# Patient Record
Sex: MALE | Employment: OTHER | URBAN - METROPOLITAN AREA
[De-identification: names, ages, dates, MRNs, and addresses within clinical notes are randomized per-mention and may not be internally consistent; named-entity substitution may affect disease eponyms.]

---

## 2017-01-17 ENCOUNTER — APPOINTMENT (OUTPATIENT)
Dept: CT IMAGING | Age: 82
DRG: 064 | End: 2017-01-17
Attending: EMERGENCY MEDICINE
Payer: MEDICARE

## 2017-01-17 ENCOUNTER — HOSPITAL ENCOUNTER (INPATIENT)
Age: 82
LOS: 3 days | Discharge: NURSING, REHAB OR LONG-TERM CARE | DRG: 064 | End: 2017-01-20
Attending: EMERGENCY MEDICINE | Admitting: HOSPITALIST
Payer: MEDICARE

## 2017-01-17 DIAGNOSIS — I63.9 CEREBROVASCULAR ACCIDENT (CVA), UNSPECIFIED MECHANISM (HCC): Primary | ICD-10-CM

## 2017-01-17 PROBLEM — G45.9 TIA (TRANSIENT ISCHEMIC ATTACK): Status: ACTIVE | Noted: 2017-01-17

## 2017-01-17 LAB
ALBUMIN SERPL BCP-MCNC: 3.8 G/DL (ref 3.5–5)
ALBUMIN/GLOB SERPL: 1 {RATIO} (ref 1.1–2.2)
ALP SERPL-CCNC: 80 U/L (ref 45–117)
ALT SERPL-CCNC: 22 U/L (ref 12–78)
ANION GAP BLD CALC-SCNC: 11 MMOL/L (ref 5–15)
APTT PPP: 27.5 SEC (ref 22.1–32.5)
AST SERPL W P-5'-P-CCNC: 16 U/L (ref 15–37)
BASOPHILS # BLD AUTO: 0 K/UL (ref 0–0.1)
BASOPHILS # BLD: 0 % (ref 0–1)
BILIRUB SERPL-MCNC: 0.7 MG/DL (ref 0.2–1)
BUN SERPL-MCNC: 48 MG/DL (ref 6–20)
BUN/CREAT SERPL: 24 (ref 12–20)
CALCIUM SERPL-MCNC: 8.7 MG/DL (ref 8.5–10.1)
CHLORIDE SERPL-SCNC: 107 MMOL/L (ref 97–108)
CK MB CFR SERPL CALC: 2 % (ref 0–2.5)
CK MB SERPL-MCNC: 6.2 NG/ML (ref 5–25)
CK SERPL-CCNC: 304 U/L (ref 39–308)
CO2 SERPL-SCNC: 24 MMOL/L (ref 21–32)
CREAT SERPL-MCNC: 2.03 MG/DL (ref 0.7–1.3)
EOSINOPHIL # BLD: 0.1 K/UL (ref 0–0.4)
EOSINOPHIL NFR BLD: 2 % (ref 0–7)
ERYTHROCYTE [DISTWIDTH] IN BLOOD BY AUTOMATED COUNT: 14.2 % (ref 11.5–14.5)
GLOBULIN SER CALC-MCNC: 3.7 G/DL (ref 2–4)
GLUCOSE SERPL-MCNC: 105 MG/DL (ref 65–100)
HCT VFR BLD AUTO: 33.9 % (ref 36.6–50.3)
HGB BLD-MCNC: 11.1 G/DL (ref 12.1–17)
INR PPP: 1 (ref 0.9–1.1)
LYMPHOCYTES # BLD AUTO: 17 % (ref 12–49)
LYMPHOCYTES # BLD: 1 K/UL (ref 0.8–3.5)
MCH RBC QN AUTO: 29.7 PG (ref 26–34)
MCHC RBC AUTO-ENTMCNC: 32.7 G/DL (ref 30–36.5)
MCV RBC AUTO: 90.6 FL (ref 80–99)
MONOCYTES # BLD: 0.5 K/UL (ref 0–1)
MONOCYTES NFR BLD AUTO: 8 % (ref 5–13)
NEUTS SEG # BLD: 4.3 K/UL (ref 1.8–8)
NEUTS SEG NFR BLD AUTO: 73 % (ref 32–75)
PLATELET # BLD AUTO: 167 K/UL (ref 150–400)
POTASSIUM SERPL-SCNC: 4.7 MMOL/L (ref 3.5–5.1)
PROT SERPL-MCNC: 7.5 G/DL (ref 6.4–8.2)
PROTHROMBIN TIME: 10.4 SEC (ref 9–11.1)
RBC # BLD AUTO: 3.74 M/UL (ref 4.1–5.7)
SODIUM SERPL-SCNC: 142 MMOL/L (ref 136–145)
THERAPEUTIC RANGE,PTTT: NORMAL SECS (ref 58–77)
TROPONIN I SERPL-MCNC: <0.04 NG/ML
WBC # BLD AUTO: 5.9 K/UL (ref 4.1–11.1)

## 2017-01-17 PROCEDURE — 85730 THROMBOPLASTIN TIME PARTIAL: CPT | Performed by: EMERGENCY MEDICINE

## 2017-01-17 PROCEDURE — 96360 HYDRATION IV INFUSION INIT: CPT

## 2017-01-17 PROCEDURE — 70450 CT HEAD/BRAIN W/O DYE: CPT

## 2017-01-17 PROCEDURE — 84484 ASSAY OF TROPONIN QUANT: CPT | Performed by: EMERGENCY MEDICINE

## 2017-01-17 PROCEDURE — 99218 HC RM OBSERVATION: CPT

## 2017-01-17 PROCEDURE — 74011250637 HC RX REV CODE- 250/637: Performed by: EMERGENCY MEDICINE

## 2017-01-17 PROCEDURE — 85610 PROTHROMBIN TIME: CPT | Performed by: EMERGENCY MEDICINE

## 2017-01-17 PROCEDURE — 74011000258 HC RX REV CODE- 258: Performed by: FAMILY MEDICINE

## 2017-01-17 PROCEDURE — 74011250636 HC RX REV CODE- 250/636: Performed by: EMERGENCY MEDICINE

## 2017-01-17 PROCEDURE — 93005 ELECTROCARDIOGRAM TRACING: CPT

## 2017-01-17 PROCEDURE — 82550 ASSAY OF CK (CPK): CPT | Performed by: EMERGENCY MEDICINE

## 2017-01-17 PROCEDURE — 94760 N-INVAS EAR/PLS OXIMETRY 1: CPT

## 2017-01-17 PROCEDURE — 80053 COMPREHEN METABOLIC PANEL: CPT | Performed by: EMERGENCY MEDICINE

## 2017-01-17 PROCEDURE — 36415 COLL VENOUS BLD VENIPUNCTURE: CPT | Performed by: EMERGENCY MEDICINE

## 2017-01-17 PROCEDURE — 74011250636 HC RX REV CODE- 250/636: Performed by: FAMILY MEDICINE

## 2017-01-17 PROCEDURE — 99285 EMERGENCY DEPT VISIT HI MDM: CPT

## 2017-01-17 PROCEDURE — 74011250637 HC RX REV CODE- 250/637: Performed by: FAMILY MEDICINE

## 2017-01-17 PROCEDURE — 85025 COMPLETE CBC W/AUTO DIFF WBC: CPT | Performed by: EMERGENCY MEDICINE

## 2017-01-17 RX ORDER — HEPARIN SODIUM 5000 [USP'U]/ML
5000 INJECTION, SOLUTION INTRAVENOUS; SUBCUTANEOUS EVERY 8 HOURS
Status: DISCONTINUED | OUTPATIENT
Start: 2017-01-17 | End: 2017-01-20 | Stop reason: HOSPADM

## 2017-01-17 RX ORDER — MEMANTINE HYDROCHLORIDE 28 MG/1
28 CAPSULE, EXTENDED RELEASE ORAL DAILY
COMMUNITY

## 2017-01-17 RX ORDER — SODIUM CHLORIDE 9 MG/ML
25 INJECTION, SOLUTION INTRAVENOUS CONTINUOUS
Status: DISCONTINUED | OUTPATIENT
Start: 2017-01-17 | End: 2017-01-18

## 2017-01-17 RX ORDER — AMLODIPINE BESYLATE 10 MG/1
10 TABLET ORAL DAILY
COMMUNITY

## 2017-01-17 RX ORDER — ACETAMINOPHEN 500 MG
1000 TABLET ORAL 2 TIMES DAILY
COMMUNITY

## 2017-01-17 RX ORDER — DONEPEZIL HYDROCHLORIDE 10 MG/1
10 TABLET, FILM COATED ORAL
COMMUNITY

## 2017-01-17 RX ORDER — AMLODIPINE BESYLATE 5 MG/1
10 TABLET ORAL DAILY
Status: DISCONTINUED | OUTPATIENT
Start: 2017-01-18 | End: 2017-01-20 | Stop reason: HOSPADM

## 2017-01-17 RX ORDER — DONEPEZIL HYDROCHLORIDE 10 MG/1
10 TABLET, FILM COATED ORAL
Status: DISCONTINUED | OUTPATIENT
Start: 2017-01-18 | End: 2017-01-20 | Stop reason: HOSPADM

## 2017-01-17 RX ORDER — LANOLIN ALCOHOL/MO/W.PET/CERES
3 CREAM (GRAM) TOPICAL
COMMUNITY

## 2017-01-17 RX ORDER — ACETAMINOPHEN 500 MG
500 TABLET ORAL
COMMUNITY

## 2017-01-17 RX ORDER — ACETAMINOPHEN 325 MG/1
650 TABLET ORAL
Status: DISCONTINUED | OUTPATIENT
Start: 2017-01-17 | End: 2017-01-20 | Stop reason: HOSPADM

## 2017-01-17 RX ORDER — QUETIAPINE FUMARATE 25 MG/1
12.5 TABLET, FILM COATED ORAL
COMMUNITY

## 2017-01-17 RX ORDER — ASPIRIN 325 MG
325 TABLET ORAL DAILY
Status: DISCONTINUED | OUTPATIENT
Start: 2017-01-18 | End: 2017-01-20 | Stop reason: HOSPADM

## 2017-01-17 RX ORDER — CHOLECALCIFEROL (VITAMIN D3) 125 MCG
1 CAPSULE ORAL DAILY
COMMUNITY

## 2017-01-17 RX ORDER — QUETIAPINE FUMARATE 25 MG/1
25 TABLET, FILM COATED ORAL 2 TIMES DAILY
Status: DISCONTINUED | OUTPATIENT
Start: 2017-01-18 | End: 2017-01-20 | Stop reason: HOSPADM

## 2017-01-17 RX ORDER — ACETAMINOPHEN 650 MG/1
650 SUPPOSITORY RECTAL
Status: DISCONTINUED | OUTPATIENT
Start: 2017-01-17 | End: 2017-01-20 | Stop reason: HOSPADM

## 2017-01-17 RX ORDER — SODIUM CHLORIDE 0.9 % (FLUSH) 0.9 %
5-10 SYRINGE (ML) INJECTION EVERY 8 HOURS
Status: DISCONTINUED | OUTPATIENT
Start: 2017-01-17 | End: 2017-01-20 | Stop reason: HOSPADM

## 2017-01-17 RX ORDER — FAMOTIDINE 20 MG/1
20 TABLET, FILM COATED ORAL EVERY 12 HOURS
Status: DISCONTINUED | OUTPATIENT
Start: 2017-01-17 | End: 2017-01-20 | Stop reason: HOSPADM

## 2017-01-17 RX ORDER — ESCITALOPRAM OXALATE 10 MG/1
10 TABLET ORAL DAILY
Status: DISCONTINUED | OUTPATIENT
Start: 2017-01-18 | End: 2017-01-20 | Stop reason: HOSPADM

## 2017-01-17 RX ORDER — ACETAMINOPHEN 500 MG
1000 TABLET ORAL 2 TIMES DAILY
Status: DISCONTINUED | OUTPATIENT
Start: 2017-01-18 | End: 2017-01-20 | Stop reason: HOSPADM

## 2017-01-17 RX ORDER — SODIUM CHLORIDE 0.9 % (FLUSH) 0.9 %
5-10 SYRINGE (ML) INJECTION AS NEEDED
Status: DISCONTINUED | OUTPATIENT
Start: 2017-01-17 | End: 2017-01-20 | Stop reason: HOSPADM

## 2017-01-17 RX ORDER — ESCITALOPRAM OXALATE 10 MG/1
10 TABLET ORAL DAILY
COMMUNITY

## 2017-01-17 RX ORDER — TAMSULOSIN HYDROCHLORIDE 0.4 MG/1
0.4 CAPSULE ORAL DAILY
Status: DISCONTINUED | OUTPATIENT
Start: 2017-01-18 | End: 2017-01-20 | Stop reason: HOSPADM

## 2017-01-17 RX ORDER — MELATONIN
2000 DAILY
Status: DISCONTINUED | OUTPATIENT
Start: 2017-01-18 | End: 2017-01-20 | Stop reason: HOSPADM

## 2017-01-17 RX ORDER — TAMSULOSIN HYDROCHLORIDE 0.4 MG/1
0.4 CAPSULE ORAL DAILY
COMMUNITY

## 2017-01-17 RX ORDER — ASPIRIN 325 MG
325 TABLET ORAL ONCE
Status: COMPLETED | OUTPATIENT
Start: 2017-01-17 | End: 2017-01-17

## 2017-01-17 RX ORDER — DEXTROSE MONOHYDRATE AND SODIUM CHLORIDE 5; .9 G/100ML; G/100ML
75 INJECTION, SOLUTION INTRAVENOUS CONTINUOUS
Status: DISCONTINUED | OUTPATIENT
Start: 2017-01-17 | End: 2017-01-20 | Stop reason: HOSPADM

## 2017-01-17 RX ADMIN — ASPIRIN 325 MG: 325 TABLET ORAL at 19:18

## 2017-01-17 RX ADMIN — FAMOTIDINE 20 MG: 20 TABLET ORAL at 22:41

## 2017-01-17 RX ADMIN — Medication 10 ML: at 22:42

## 2017-01-17 RX ADMIN — DEXTROSE MONOHYDRATE AND SODIUM CHLORIDE 75 ML/HR: 5; .9 INJECTION, SOLUTION INTRAVENOUS at 22:10

## 2017-01-17 RX ADMIN — HEPARIN SODIUM 5000 UNITS: 5000 INJECTION, SOLUTION INTRAVENOUS; SUBCUTANEOUS at 22:41

## 2017-01-17 RX ADMIN — SODIUM CHLORIDE 500 ML: 900 INJECTION, SOLUTION INTRAVENOUS at 18:30

## 2017-01-17 NOTE — Clinical Note
Status[de-identified] Inpatient [101] Type of Bed: Neuro/Stroke [9] Inpatient Hospitalization Certified Necessary for the Following Reasons: 3. Patient receiving treatment that can only be provided in an inpatient setting (further clarification in H&P documentation) Admitting Diagnosis: TIA (transient ischemic attack) [193753] Admitting Physician: Inez Dye Attending Physician: Inez Dye Estimated Length of Stay: > or = to 2 Midnights Discharge Plan[de-identified] Extended Care Facility (e.g. Adult Home, Nursing Home, etc.)

## 2017-01-17 NOTE — ED TRIAGE NOTES
Patient arrives via EMS from [facility] with c/o facial droop and R sided weakness that started yesterday evening. [facility] staff and MD evaluated patient last night, but did not want patient transported out last night. EMS BS 90.

## 2017-01-17 NOTE — ED NOTES
Bedside and Verbal shift change report given to Cruz Lloyd RN (oncoming nurse) by Ida Heck RN (offgoing nurse). Report given with SBAR, Kardex, Intake/Output, MAR and Recent Results.

## 2017-01-17 NOTE — ED NOTES
Pt resting comfortably in room; No acute distress noted;  Family remains @ bedside; Denies any needs @ this time; Updated on plan of care

## 2017-01-17 NOTE — ED PROVIDER NOTES
HPI Comments: 80 y.o. male with past medical history significant for memory impairment, HTN who presents via EMS from [facility] with chief complaint of weakness. Per the daughter, pt began last night with being unable to hold his eating utensils with the right hand, unable to hold glass with right hand, and right facial droop, all of which are new for him. Pt states that he woke from a nap yesterday and didn't know where he was, says he was brought to the ED because \"they think I had a stroke\". Daughter notes that pt had a headache earlier and was given tylenol at his facility. Daughter denies hx of previous stroke. Pt offers no complaints. Pt has a DNR. There are no other acute medical concerns at this time. Social hx: pt resides at [facility]. PCP: Shahrzad Nguyen MD    Full history, physical exam, and ROS unable to be obtained due to:  Memory impairment, pt not a good historian. Note written by Pushpa Diaz, as dictated by Vielka Schaeffer MD 5:11 PM      The history is provided by the patient and a relative. Past Medical History:   Diagnosis Date    Hypertension     Memory impairment        History reviewed. No pertinent past surgical history. History reviewed. No pertinent family history. Social History     Social History    Marital status:      Spouse name: N/A    Number of children: N/A    Years of education: N/A     Occupational History    Not on file. Social History Main Topics    Smoking status: Current Some Day Smoker    Smokeless tobacco: Not on file      Comment: [address]    Alcohol use Yes    Drug use: No    Sexual activity: Not on file     Other Topics Concern    Not on file     Social History Narrative         ALLERGIES: Review of patient's allergies indicates no known allergies.     Review of Systems   Unable to perform ROS: Other       Vitals:    01/17/17 1707 01/17/17 1749   BP: 163/72 152/80   Pulse: 66 68   Resp: 16 16 Temp: 98 °F (36.7 °C)    SpO2: 93% 96%   Weight: 63.2 kg (139 lb 5.3 oz)    Height: 5' 9\" (1.753 m)             Physical Exam   Physical Examination: General appearance - alert, elderly, chronically ill appearing, no acute distress, oriented to person, place, and time and normal appearing weight  Eyes - pupils equal and reactive, extraocular eye movements intact  HEENT-b/l hearing aids  Neck - supple, no significant adenopathy  Chest - clear to auscultation, no wheezes, rales or rhonchi, symmetric air entry  Heart - normal rate, regular rhythm, normal S1, S2, no murmurs, rubs, clicks or gallops  Abdomen - soft, nontender, nondistended, no masses or organomegaly  Back exam - full range of motion, no tenderness, palpable spasm or pain on motion  Neurological - alert, oriented, normal speech, slight right facial droop, normal f-n-f, no nystagmus, no pronator drift, equal strength in b/l arms and legs  Musculoskeletal - no joint tenderness, deformity or swelling  Extremities - peripheral pulses normal, no pedal edema, no clubbing or cyanosis  Skin - normal coloration and turgor, no rashes, no suspicious skin lesions noted  MDM  Number of Diagnoses or Management Options  Cerebrovascular accident (CVA), unspecified mechanism ([address]):      Amount and/or Complexity of Data Reviewed  Clinical lab tests: ordered and reviewed  Tests in the radiology section of CPT®: ordered and reviewed  Decide to obtain previous medical records or to obtain history from someone other than the patient: yes  Obtain history from someone other than the patient: yes (daughter)  Review and summarize past medical records: yes  Discuss the patient with other providers: yes (hospitalist)  Independent visualization of images, tracings, or specimens: yes    Patient Progress  Patient progress: stable    ED Course       Procedures  EKG interpretation: (Preliminary)  Rhythm: normal sinus rhythm; and regular .  Rate (approx.): 64; Axis: normal; CA interval: prolonged; QRS interval: normal ; ST/T wave: non-specific changes; 1st degree AVB    CONSULT NOTE:  6:45 PM Stefanie Pozo MD spoke with Dr. Randolph Bergman MD, Consult for Hospitalist.  Discussed available diagnostic tests and clinical findings. Dr. Heather Garcia will evaluate pt for admit. Pt and daughter updated on test results and plan for admission to [facility] for CVA with new onset right sided facial droop and right sided weakness onset last night.

## 2017-01-17 NOTE — IP AVS SNAPSHOT
Current Discharge Medication List  
  
Take these medications at their scheduled times Dose & Instructions Dispensing Information Comments Morning Noon Evening Bedtime  
 amLODIPine 10 mg tablet Commonly known as:  Asya Blade Your next dose is: Today, Tomorrow Other:  ____________ Dose:  10 mg Take 10 mg by mouth daily. Indications: Hypertension Refills:  0  
     
   
   
   
  
 aspirin 325 mg tablet Commonly known as:  ASPIRIN Your next dose is: Today, Tomorrow Other:  ____________ Dose:  325 mg Take 1 Tab by mouth daily. Quantity:  30 Tab Refills:  0  
     
   
   
   
  
 atorvastatin 20 mg tablet Commonly known as:  LIPITOR Your next dose is: Today, Tomorrow Other:  ____________ Dose:  20 mg Take 1 Tab by mouth daily. Quantity:  30 Tab Refills:  0  
     
   
   
   
  
 donepezil 10 mg tablet Commonly known as:  ARICEPT Your next dose is: Today, Tomorrow Other:  ____________ Dose:  10 mg Take 10 mg by mouth nightly. Refills:  0  
     
   
   
   
  
 FLOMAX 0.4 mg capsule Generic drug:  tamsulosin Your next dose is: Today, Tomorrow Other:  ____________ Dose:  0.4 mg Take 0.4 mg by mouth daily. Indications: SYMPTOMATIC BENIGN PROSTATIC HYPERPLASIA Refills:  0 LEXAPRO 10 mg tablet Generic drug:  escitalopram oxalate Your next dose is: Today, Tomorrow Other:  ____________ Dose:  10 mg Take 10 mg by mouth daily. Refills:  0  
     
   
   
   
  
 melatonin 3 mg tablet Your next dose is: Today, Tomorrow Other:  ____________ Dose:  3 mg Take 3 mg by mouth nightly. Refills:  0  
     
   
   
   
  
 NAMENDA XR 28 mg capsule Generic drug:  memantine ER Your next dose is: Today, Tomorrow Other:  ____________  Dose:  28 mg  
 Take 28 mg by mouth daily. Refills:  0  
     
   
   
   
  
 * TYLENOL EXTRA STRENGTH 500 mg tablet Generic drug:  acetaminophen Your next dose is: Today, Tomorrow Other:  ____________ Dose:  1000 mg Take 1,000 mg by mouth two (2) times a day. Refills:  0  
     
   
   
   
  
 VITAMIN D3 2,000 unit Tab Generic drug:  cholecalciferol (vitamin D3) Your next dose is: Today, Tomorrow Other:  ____________ Dose:  1 Tab Take 1 Tab by mouth daily. Refills:  0  
     
   
   
   
  
 * Notice: This list has 1 medication(s) that are the same as other medications prescribed for you. Read the directions carefully, and ask your doctor or other care provider to review them with you. Take these medications as needed Dose & Instructions Dispensing Information Comments Morning Noon Evening Bedtime SEROquel 25 mg tablet Generic drug:  QUEtiapine Your next dose is: Today, Tomorrow Other:  ____________ Dose:  12.5 mg Take 12.5 mg by mouth two (2) times daily as needed. Refills:  0  
     
   
   
   
  
 * TYLENOL EXTRA STRENGTH 500 mg tablet Generic drug:  acetaminophen Your next dose is: Today, Tomorrow Other:  ____________ Dose:  500 mg Take 500 mg by mouth every four (4) hours as needed for Pain. Refills:  0  
     
   
   
   
  
 * Notice: This list has 1 medication(s) that are the same as other medications prescribed for you. Read the directions carefully, and ask your doctor or other care provider to review them with you. Where to Get Your Medications Information about where to get these medications is not yet available ! Ask your nurse or doctor about these medications  
  aspirin 325 mg tablet  
 atorvastatin 20 mg tablet

## 2017-01-17 NOTE — IP AVS SNAPSHOT
[address] 
[phone] Patient: Enedelia MRN: [id number] ZER:[birth year] You are allergic to the following No active allergies Recent Documentation Height Weight BMI Smoking Status 1.753 m 62.9 kg 20.48 kg/m2 Current Some Day Smoker Emergency Contacts Name Discharge Info Relation Home Work Mobile [address] CAREGIVER [3] Daughter [21] [phone] About your hospitalization You were admitted on:  January 17, 2017 You last received care in the:  [facility] 6S NEURO-SCI TELE You were discharged on:  January 20, 2017 Unit phone number:  [phone] Why you were hospitalized Your primary diagnosis was:  Not on File Your diagnoses also included:  Stroke (Cerebrum) (Hcc), Acute Ischemic Stroke (Hcc), Acute Cva (Cerebrovascular Accident) (Hcc) Providers Seen During Your Hospitalizations Provider Role Specialty Primary office phone Karin Johnson MD Attending Provider Emergency Medicine [phone] Patrick Horvath MD Attending Provider [facility] [phone] Carmen Wells MD Attending Provider Internal Medicine [phone] Your Primary Care Physician (PCP) Primary Care Physician Office Phone Office Fax OTHER, PHYS ** None ** ** None ** Follow-up Information Follow up With Details Comments Contact Info Ken Miranda, DO In 6 weeks for stroke follow up [address] [place] [address] [facility] at [address] 
[phone] Phys MD Wendy   Patient can only remember the practice name and not the physician Bobo Ortega [address] 
[phone] Current Discharge Medication List  
  
START taking these medications Dose & Instructions Dispensing Information Comments Morning Noon Evening Bedtime  
 aspirin 325 mg tablet Commonly known as:  ASPIRIN Your next dose is: Today, Tomorrow Other:  _________ Dose:  325 mg Take 1 Tab by mouth daily. Quantity:  30 Tab Refills:  0  
     
   
   
   
  
 atorvastatin 20 mg tablet Commonly known as:  LIPITOR Your next dose is: Today, Tomorrow Other:  _________ Dose:  20 mg Take 1 Tab by mouth daily. Quantity:  30 Tab Refills:  0 CONTINUE these medications which have NOT CHANGED Dose & Instructions Dispensing Information Comments Morning Noon Evening Bedtime  
 amLODIPine 10 mg tablet Commonly known as:  Izetta Harder Your next dose is: Today, Tomorrow Other:  _________ Dose:  10 mg Take 10 mg by mouth daily. Indications: Hypertension Refills:  0  
     
   
   
   
  
 donepezil 10 mg tablet Commonly known as:  ARICEPT Your next dose is: Today, Tomorrow Other:  _________ Dose:  10 mg Take 10 mg by mouth nightly. Refills:  0  
     
   
   
   
  
 FLOMAX 0.4 mg capsule Generic drug:  tamsulosin Your next dose is: Today, Tomorrow Other:  _________ Dose:  0.4 mg Take 0.4 mg by mouth daily. Indications: SYMPTOMATIC BENIGN PROSTATIC HYPERPLASIA Refills:  0 LEXAPRO 10 mg tablet Generic drug:  escitalopram oxalate Your next dose is: Today, Tomorrow Other:  _________ Dose:  10 mg Take 10 mg by mouth daily. Refills:  0  
     
   
   
   
  
 melatonin 3 mg tablet Your next dose is: Today, Tomorrow Other:  _________ Dose:  3 mg Take 3 mg by mouth nightly. Refills:  0  
     
   
   
   
  
 NAMENDA XR 28 mg capsule Generic drug:  memantine ER Your next dose is: Today, Tomorrow Other:  _________ Dose:  28 mg Take 28 mg by mouth daily. Refills:  0 SEROquel 25 mg tablet Generic drug:  QUEtiapine Your next dose is: Today, Tomorrow Other:  _________ Dose:  12.5 mg Take 12.5 mg by mouth two (2) times daily as needed. Refills:  0  
     
   
   
   
  
 * TYLENOL EXTRA STRENGTH 500 mg tablet Generic drug:  acetaminophen Your next dose is: Today, Tomorrow Other:  _________ Dose:  1000 mg Take 1,000 mg by mouth two (2) times a day. Refills:  0  
     
   
   
   
  
 * TYLENOL EXTRA STRENGTH 500 mg tablet Generic drug:  acetaminophen Your next dose is: Today, Tomorrow Other:  _________ Dose:  500 mg Take 500 mg by mouth every four (4) hours as needed for Pain. Refills:  0  
     
   
   
   
  
 VITAMIN D3 2,000 unit Tab Generic drug:  cholecalciferol (vitamin D3) Your next dose is: Today, Tomorrow Other:  _________ Dose:  1 Tab Take 1 Tab by mouth daily. Refills:  0  
     
   
   
   
  
 * Notice: This list has 2 medication(s) that are the same as other medications prescribed for you. Read the directions carefully, and ask your doctor or other care provider to review them with you. Where to Get Your Medications Information on where to get these meds will be given to you by the nurse or doctor. ! Ask your nurse or doctor about these medications  
  aspirin 325 mg tablet  
 atorvastatin 20 mg tablet Discharge Instructions Discharge SNF/Rehab Instructions/LTAC  
 
 
PATIENT ID: Enedelia MRN: [id number] YOB: 1925 DATE OF ADMISSION: 1/17/2017  4:59 PM   
DATE OF DISCHARGE: 1/20/2017 PRIMARY CARE PROVIDER: Shahrzad Nguyen MD  
 
 
DISCHARGING PHYSICIAN: Zayra Bragg NP To contact this individual call [phone] and ask the  to page. If unavailable ask to be transferred the Adult Hospitalist Department. CONSULTATIONS: IP CONSULT TO HOSPITALIST 
IP CONSULT TO NEUROLOGY PROCEDURES/SURGERIES: * No surgery found * [address] COURSE:  
 
Krista Dillard is a 80 y.o. male with advanced dementia with behavioral disorder was brought to the ER on 1/17 for weakness,right facial droop noticed a day prior to presentation. He resides at [facility] in the memory care unit. He is DNR. He found to have had a large multi foci left cerebral infarction involving the frontal , parietal and occipital lobes. Added 325 mg aspirin and Lipitor to medication regimen. Patient is recovering well, working with PT and OT. He will transfer to [address] [place] [address]. DISCHARGE DIAGNOSES / PLAN:   
 
Acute multi foci left cerebral infarction involving the frontal,parietal and occipital lobes. Chronic SDH 
-Start asa,lipitor 
-Echocardiogram with normal EF,no LV thrombus,noapparent shunt 
-CUS <50% stenosis bilateral ICA! 
-Daughter would not want any aggressive measures such as CEA if it was indicated. Focus on medical mx. So no MRA/CTA brain 
-Tele neurologist consulted. Patient was not candidate for tPA. -Complete work up 
-discharge to skilled nursing  
  
Dementia with behavioral disorder 
-Continue with aricept,lexapro,seroquel 
  
HTN: on norvasc CKD stage III 
   
 
 
PENDING TEST RESULTS:  
At the time of discharge the following test results are still pending: none FOLLOW UP APPOINTMENTS:   
Follow-up Information Follow up With Details Comments Contact Info King Parul DO In 6 weeks for stroke follow up [address] Neurology Clinic at [address] 
[phone] ADDITIONAL CARE RECOMMENDATIONS:  
 
1. Please add aspirin and lipitor to daily medication regimen. 2. Follow up with PCP as needed . DIET: Resume previous diet ACTIVITY: Activity as tolerated DISCHARGE MEDICATIONS: 
 See Medication Reconciliation Form NOTIFY YOUR PHYSICIAN FOR ANY OF THE FOLLOWING:  
Fever over 101 degrees for 24 hours. Chest pain, shortness of breath, fever, chills, nausea, vomiting, diarrhea, change in mentation, falling, weakness, bleeding. Severe pain or pain not relieved by medications. Or, any other signs or symptoms that you may have questions about. DISPOSITION: 
  Home With: 
 OT  PT  HH  RN  
  
 SNF/Inpatient Rehab/LTAC  
X Independent/assisted living Hospice Other:  
 
 
PATIENT CONDITION AT DISCHARGE:  
 
Functional status Poor X Deconditioned Independent Cognition Bettey Cane Forgetful X Dementia Catheters/lines (plus indication) Boucher PICC   
 PEG   
X None Code status Full code X DNR   
 
 
 
CHRONIC MEDICAL DIAGNOSES: 
Problem List as of 1/20/2017  Date Reviewed: 1/20/2017 Codes Class Noted - Resolved Acute ischemic stroke [facility]) ICD-10-CM: I63.9 ICD-9-CM: 434.91  1/18/2017 - Present Acute CVA (cerebrovascular accident) ([facility] [address] ICD-10-CM: I63.9 ICD-9-CM: 434.91  1/18/2017 - Present Stroke (cerebrum) ([facility] [address] ICD-10-CM: I63.9 ICD-9-CM: 434.91  1/17/2017 - Present HTN (hypertension) (Chronic) ICD-10-CM: I10 
ICD-9-CM: 401.9  11/8/2016 - Present BPH (benign prostatic hyperplasia) (Chronic) ICD-10-CM: N40.0 ICD-9-CM: 600.00  11/8/2016 - Present Dementia (Chronic) ICD-10-CM: F03.90 ICD-9-CM: 294.20  11/8/2016 - Present SDH (subdural hematoma) (HCC) ICD-10-CM: I62.00 ICD-9-CM: 432.1  11/6/2016 - Present CDMP Checked: Yes X Signed:  
Ilana Dalton NP 
1/20/2017 
3:16 PM 
 
  
 
Discharge Orders None [facility] Announcement We are excited to announce that we are making your provider's discharge notes available to you in [employer].   You will see these notes when they are completed and signed by the physician that discharged you from your recent hospital stay. If you have any questions or concerns about any information you see in [employer], please call the Health Information Department where you were seen or reach out to your Primary Care Provider for more information about your plan of care. Introducing [facility]! [employer] introduces Canvas patient portal. Now you can access parts of your medical record, email your doctor's office, and request medication refills online. 1. In your internet browser, go to [email or web address] 2. Click on the First Time User? Click Here link in the Sign In box. You will see the New Member Sign Up page. 3. Enter your Canvas Access Code exactly as it appears below. You will not need to use this code after youve completed the sign-up process. If you do not sign up before the expiration date, you must request a new code. · Canvas Access Code: [email or web address] Expires: 2/12/2017 12:54 PM 
 
4. Enter the last four digits of your Social Security Number (xxxx) and Date of Birth (mm/dd/yyyy) as indicated and click Submit. You will be taken to the next sign-up page. 5. Create a Canvas ID. This will be your Canvas login ID and cannot be changed, so think of one that is secure and easy to remember. 6. Create a Canvas password. You can change your password at any time. 7. Enter your Password Reset Question and Answer. This can be used at a later time if you forget your password. 8. Enter your e-mail address. You will receive e-mail notification when new information is available in [address] 9. Click Sign Up. You can now view and download portions of your medical record. 10. Click the Download Summary menu link to download a portable copy of your medical information. If you have questions, please visit the Frequently Asked Questions section of the Canvas website. Remember, Canvas is NOT to be used for urgent needs. For medical emergencies, dial 911. Now available from your iPhone and Android! General Information Please provide this summary of care documentation to your next provider. Patient Signature:  ____________________________________________________________ Date:  ____________________________________________________________  
  
Dave Bougie Provider Signature:  ____________________________________________________________ Date:  ____________________________________________________________

## 2017-01-18 ENCOUNTER — APPOINTMENT (OUTPATIENT)
Dept: MRI IMAGING | Age: 82
DRG: 064 | End: 2017-01-18
Attending: FAMILY MEDICINE
Payer: MEDICARE

## 2017-01-18 PROBLEM — I63.9 ACUTE ISCHEMIC STROKE (HCC): Status: ACTIVE | Noted: 2017-01-18

## 2017-01-18 PROBLEM — I63.9 STROKE (CEREBRUM) (HCC): Status: ACTIVE | Noted: 2017-01-17

## 2017-01-18 PROBLEM — I63.9 ACUTE CVA (CEREBROVASCULAR ACCIDENT) (HCC): Status: ACTIVE | Noted: 2017-01-18

## 2017-01-18 LAB
ANION GAP BLD CALC-SCNC: 11 MMOL/L (ref 5–15)
ATRIAL RATE: 64 BPM
BUN SERPL-MCNC: 41 MG/DL (ref 6–20)
BUN/CREAT SERPL: 25 (ref 12–20)
CALCIUM SERPL-MCNC: 8.6 MG/DL (ref 8.5–10.1)
CALCULATED P AXIS, ECG09: 63 DEGREES
CALCULATED R AXIS, ECG10: -5 DEGREES
CALCULATED T AXIS, ECG11: 68 DEGREES
CHLORIDE SERPL-SCNC: 109 MMOL/L (ref 97–108)
CHOLEST SERPL-MCNC: 147 MG/DL
CO2 SERPL-SCNC: 21 MMOL/L (ref 21–32)
CREAT SERPL-MCNC: 1.63 MG/DL (ref 0.7–1.3)
CRP SERPL HS-MCNC: 3 MG/L
DIAGNOSIS, 93000: NORMAL
ERYTHROCYTE [DISTWIDTH] IN BLOOD BY AUTOMATED COUNT: 14.5 % (ref 11.5–14.5)
ERYTHROCYTE [SEDIMENTATION RATE] IN BLOOD: 14 MM/HR (ref 0–20)
EST. AVERAGE GLUCOSE BLD GHB EST-MCNC: 103 MG/DL
GLUCOSE BLD STRIP.AUTO-MCNC: 90 MG/DL (ref 65–100)
GLUCOSE SERPL-MCNC: 94 MG/DL (ref 65–100)
HBA1C MFR BLD: 5.2 % (ref 4.2–6.3)
HCT VFR BLD AUTO: 34.5 % (ref 36.6–50.3)
HDLC SERPL-MCNC: 57 MG/DL
HDLC SERPL: 2.6 {RATIO} (ref 0–5)
HGB BLD-MCNC: 11.1 G/DL (ref 12.1–17)
LDLC SERPL CALC-MCNC: 74.6 MG/DL (ref 0–100)
LIPID PROFILE,FLP: NORMAL
MCH RBC QN AUTO: 28.9 PG (ref 26–34)
MCHC RBC AUTO-ENTMCNC: 32.2 G/DL (ref 30–36.5)
MCV RBC AUTO: 89.8 FL (ref 80–99)
P-R INTERVAL, ECG05: 220 MS
PLATELET # BLD AUTO: 164 K/UL (ref 150–400)
POTASSIUM SERPL-SCNC: 4.1 MMOL/L (ref 3.5–5.1)
Q-T INTERVAL, ECG07: 422 MS
QRS DURATION, ECG06: 84 MS
QTC CALCULATION (BEZET), ECG08: 435 MS
RBC # BLD AUTO: 3.84 M/UL (ref 4.1–5.7)
SERVICE CMNT-IMP: NORMAL
SODIUM SERPL-SCNC: 141 MMOL/L (ref 136–145)
TRIGL SERPL-MCNC: 77 MG/DL (ref ?–150)
TSH SERPL DL<=0.05 MIU/L-ACNC: 5.32 UIU/ML (ref 0.36–3.74)
VENTRICULAR RATE, ECG03: 64 BPM
VLDLC SERPL CALC-MCNC: 15.4 MG/DL
WBC # BLD AUTO: 5.2 K/UL (ref 4.1–11.1)

## 2017-01-18 PROCEDURE — 74011250637 HC RX REV CODE- 250/637: Performed by: FAMILY MEDICINE

## 2017-01-18 PROCEDURE — 70551 MRI BRAIN STEM W/O DYE: CPT

## 2017-01-18 PROCEDURE — 99218 HC RM OBSERVATION: CPT

## 2017-01-18 PROCEDURE — 36415 COLL VENOUS BLD VENIPUNCTURE: CPT | Performed by: FAMILY MEDICINE

## 2017-01-18 PROCEDURE — 82962 GLUCOSE BLOOD TEST: CPT

## 2017-01-18 PROCEDURE — 97166 OT EVAL MOD COMPLEX 45 MIN: CPT

## 2017-01-18 PROCEDURE — 83036 HEMOGLOBIN GLYCOSYLATED A1C: CPT | Performed by: FAMILY MEDICINE

## 2017-01-18 PROCEDURE — 97112 NEUROMUSCULAR REEDUCATION: CPT

## 2017-01-18 PROCEDURE — 93306 TTE W/DOPPLER COMPLETE: CPT

## 2017-01-18 PROCEDURE — 80048 BASIC METABOLIC PNL TOTAL CA: CPT

## 2017-01-18 PROCEDURE — 93880 EXTRACRANIAL BILAT STUDY: CPT

## 2017-01-18 PROCEDURE — 74011250636 HC RX REV CODE- 250/636: Performed by: FAMILY MEDICINE

## 2017-01-18 PROCEDURE — 97165 OT EVAL LOW COMPLEX 30 MIN: CPT

## 2017-01-18 PROCEDURE — 80061 LIPID PANEL: CPT | Performed by: FAMILY MEDICINE

## 2017-01-18 PROCEDURE — 84443 ASSAY THYROID STIM HORMONE: CPT | Performed by: FAMILY MEDICINE

## 2017-01-18 PROCEDURE — 74011000258 HC RX REV CODE- 258: Performed by: FAMILY MEDICINE

## 2017-01-18 PROCEDURE — 85027 COMPLETE CBC AUTOMATED: CPT | Performed by: FAMILY MEDICINE

## 2017-01-18 PROCEDURE — 65660000000 HC RM CCU STEPDOWN

## 2017-01-18 PROCEDURE — 85652 RBC SED RATE AUTOMATED: CPT | Performed by: FAMILY MEDICINE

## 2017-01-18 PROCEDURE — 86141 C-REACTIVE PROTEIN HS: CPT | Performed by: FAMILY MEDICINE

## 2017-01-18 PROCEDURE — 92610 EVALUATE SWALLOWING FUNCTION: CPT

## 2017-01-18 PROCEDURE — 74011250637 HC RX REV CODE- 250/637: Performed by: PSYCHIATRY & NEUROLOGY

## 2017-01-18 RX ORDER — ATORVASTATIN CALCIUM 20 MG/1
20 TABLET, FILM COATED ORAL DAILY
Status: DISCONTINUED | OUTPATIENT
Start: 2017-01-18 | End: 2017-01-20 | Stop reason: HOSPADM

## 2017-01-18 RX ORDER — SODIUM CHLORIDE 0.9 % (FLUSH) 0.9 %
20 SYRINGE (ML) INJECTION
Status: COMPLETED | OUTPATIENT
Start: 2017-01-18 | End: 2017-01-18

## 2017-01-18 RX ORDER — SODIUM CHLORIDE 0.9 % (FLUSH) 0.9 %
30 SYRINGE (ML) INJECTION
Status: COMPLETED | OUTPATIENT
Start: 2017-01-18 | End: 2017-01-18

## 2017-01-18 RX ADMIN — ASPIRIN 325 MG: 325 TABLET ORAL at 13:29

## 2017-01-18 RX ADMIN — Medication 10 ML: at 14:51

## 2017-01-18 RX ADMIN — HEPARIN SODIUM 5000 UNITS: 5000 INJECTION, SOLUTION INTRAVENOUS; SUBCUTANEOUS at 06:22

## 2017-01-18 RX ADMIN — QUETIAPINE FUMARATE 25 MG: 25 TABLET, FILM COATED ORAL at 13:29

## 2017-01-18 RX ADMIN — Medication 10 ML: at 21:36

## 2017-01-18 RX ADMIN — FAMOTIDINE 20 MG: 20 TABLET ORAL at 13:29

## 2017-01-18 RX ADMIN — Medication 30 ML: at 11:47

## 2017-01-18 RX ADMIN — VITAMIN D, TAB 1000IU (100/BT) 2000 UNITS: 25 TAB at 13:29

## 2017-01-18 RX ADMIN — HEPARIN SODIUM 5000 UNITS: 5000 INJECTION, SOLUTION INTRAVENOUS; SUBCUTANEOUS at 21:36

## 2017-01-18 RX ADMIN — Medication 20 ML: at 11:47

## 2017-01-18 RX ADMIN — HEPARIN SODIUM 5000 UNITS: 5000 INJECTION, SOLUTION INTRAVENOUS; SUBCUTANEOUS at 14:50

## 2017-01-18 RX ADMIN — DONEPEZIL HYDROCHLORIDE 10 MG: 10 TABLET, FILM COATED ORAL at 00:09

## 2017-01-18 RX ADMIN — TAMSULOSIN HYDROCHLORIDE 0.4 MG: 0.4 CAPSULE ORAL at 13:29

## 2017-01-18 RX ADMIN — DEXTROSE MONOHYDRATE AND SODIUM CHLORIDE 75 ML/HR: 5; .9 INJECTION, SOLUTION INTRAVENOUS at 16:13

## 2017-01-18 RX ADMIN — Medication 10 ML: at 06:22

## 2017-01-18 RX ADMIN — AMLODIPINE BESYLATE 10 MG: 5 TABLET ORAL at 13:28

## 2017-01-18 RX ADMIN — ESCITALOPRAM OXALATE 10 MG: 10 TABLET ORAL at 13:29

## 2017-01-18 RX ADMIN — ACETAMINOPHEN 1000 MG: 500 TABLET, FILM COATED ORAL at 13:29

## 2017-01-18 NOTE — PROGRESS NOTES
Code S activated when daughter reported to RN patient seemed worse than yesterday. He has base line dementia. Walks with a walker. Came from [facility] memory unit  Daughter at bedside. She brought him yesterday for right arm weakness and he was admitted for stroke. MRI this morning has already showed multiple ischemic stroke on the left side    On exam    O: patient awake, seems to talk clearly,not in distress  Patient Vitals for the past 8 hrs:   Temp Pulse Resp BP SpO2   01/18/17 0700 98 °F (36.7 °C) 65 15 122/79 98 %   01/18/17 0300 97 °F (36.1 °C) 66 19 140/59 99 %         CNS: responds in clear words,but would not engage much  Motor: UE he raises both UE and drops them,does not seem to have focal deficit but exam limited pt not too cooperative             He was able to raise both legs at the same time! And keep them up,so it appears he has no gross deficit    A/p    Acute stroke,multiple foci on the left brain. He was admitted for this,he is well outside of tPA window. Cancelled code S.I talked on the phone with Dr Remigio Melo from tele neurologist who did not believe code S was needed to be activated either. Neurologist consulted  Stroke w/u  Daughter confirmed he is DNR,order entered.

## 2017-01-18 NOTE — PROGRESS NOTES
Speech Pathology bedside swallow evaluation/discharge  Patient: Cheyanne Rdz (89 y.o. male)  Date: 1/18/2017  Primary Diagnosis: TIA (transient ischemic attack)  TIA (transient ischemic attack)  Acute ischemic stroke ([facility] [address]  Acute CVA (cerebrovascular accident) ([facility] [address]        Precautions:        ASSESSMENT :  Based on the objective data described below, the patient presents with functional oropharyngeal swallow. Timely and complete mastication of solids. Timely swallow initiation and adequate laryngeal movement via palpation. No overt s/s aspiration with straw sips of thin or solids. Of note, OT noted some drooling during session; however, this was not noted during swallow evaluation. Skilled therapy provided by a speech-language pathologist is not indicated at this time. PLAN :  Recommendations:  Regular diet. Thin liquids. Straws ok  Fully upright with PO  Feeding assistance at meals    Discharge Recommendations: None     SUBJECTIVE:   Patient stated [facility] would you feel if someone hurt your family? OBJECTIVE:     Past Medical History   Diagnosis Date    Hypertension     Memory impairment    History reviewed. No pertinent past surgical history.   Prior Level of Function/Home Situation:   Home Situation  Home Environment: [address] [place] [address] Name: barrett hernandez  One/Two Story Residence: One story  Living Alone: No  Support Systems: Skilled nursing facility  Patient Expects to be Discharged to[de-identified] Skilled nursing facility  Current DME Used/Available at Home: None  Diet prior to admission: regular/ thin liquids  Current Diet:  Regular/thin   Cognitive and Communication Status:  Neurologic State: Alert  Orientation Level: Oriented to person  Cognition: Follows commands (for OME)  Perception: Appears intact  Perseveration: No perseveration noted  Safety/Judgement: Awareness of environment  Oral Assessment:  Oral Assessment  Labial: No impairment  Dentition: Natural  Oral Hygiene: (wfl)  Lingual: Left deviation  Velum: Unable to visualize  Mandible: No impairment  P.O. Trials:  Patient Position:  (upright in bed)  Vocal quality prior to P.O.: No impairment  Consistency Presented: Thin liquid; Solid  How Presented: Successive swallows;Straw;Self-fed/presented     Bolus Acceptance: No impairment  Bolus Formation/Control: No impairment     Propulsion: No impairment  Oral Residue: None  Initiation of Swallow: No impairment  Laryngeal Elevation: Functional  Aspiration Signs/Symptoms: None  Pharyngeal Phase Characteristics: No impairment, issues, or problems              Oral Phase Severity: No impairment  Pharyngeal Phase Severity : No impairment  NOMS:   The NOMS functional outcome measure was used to quantify this patient's level of swallowing impairment. Based on the NOMS, the patient was determined to be at level 7 for swallow function     G Codes: In compliance with CMSs Claims Based Outcome Reporting, the following G-code set was chosen for this patient based the use of the NOMS functional outcome to quantify this patient's level of swallowing impairment. Using the NOMS, the patient was determined to be at level 7 for swallow function which correlates with the CH= 0% level of severity. Based on the objective assessment provided within this note, the current, goal, and discharge g-codes are as follows:    Swallow  Swallowing:   Swallow Current Status CH= 0%   Swallow Goal Status CH= 0%   Swallow D/C Status CH= 0%      NOMS Swallowing Levels:  Level 1 (CN): NPO  Level 2 (CM): NPO but takes consistency in therapy  Level 3 (CL): Takes less than 50% of nutrition p.o. and continues with nonoral feedings; and/or safe with mod cues; and/or max diet restriction  Level 4 (CK):  Safe swallow but needs mod cues; and/or mod diet restriction; and/or still requires some nonoral feeding/supplements  Level 5 (CJ): Safe swallow with min diet restriction; and/or needs min cues  Level 6 (CI): Independent with p.o.; rare cues; usually self cues; may need to avoid some foods or needs extra time  Level 7 ([address] [place] [address]): Independent for all p.o.  AMBIKA (2003). National Outcomes Measurement System (NOMS): Adult Speech-Language Pathology User's Guide. Pain:  Pain Scale 1: Numeric (0 - 10)  Pain Intensity 1: 0     After treatment:   [] Patient left in no apparent distress sitting up in chair  [x] Patient left in no apparent distress in bed  [x] Call bell left within reach  [x] Nursing notified  [] Caregiver present  [] Bed alarm activated    COMMUNICATION/EDUCATION:   The patients plan of care including findings, recommendations, and recommended diet changes were discussed with: Registered Nurse.  [] Posted safety precautions in patient's room. [x] Patient/family have participated as able and agree with findings and recommendations. [] Patient is unable to participate in plan of care at this time.     Thank you for this referral.  Gabrielle Payne M.S. CCC-SLP  Time Calculation: 11 mins

## 2017-01-18 NOTE — PROGRESS NOTES
Problem: Self Care Deficits Care Plan (Adult)  Goal: *Acute Goals and Plan of Care (Insert Text)  Occupational Therapy Goals  Initiated 1/18/16  1. Pt will complete self feeding at MOD I level within 7 days. 2. Pt will complete toilet transfers with supervision within 7 days. 3. Pt will complete simple grooming tasks with MOD I within 7 days. 4. Pt will complete bathing with MIN A within 7 days. 5. Pt will improve fugl royal score by 5.2 points within 7 days. OCCUPATIONAL THERAPY EVALUATION  Patient: Venita Ford (40 y.o. male)  Date: 1/18/2017  Primary Diagnosis: TIA (transient ischemic attack)  TIA (transient ischemic attack)  Acute ischemic stroke [facility])  Acute CVA (cerebrovascular accident) [facility])        Precautions: Fall         ASSESSMENT :  Based on the objective data described below, the patient presents with [place]/decreased command following, PMH significant for dementia, R UE fine motor coordination impairments limiting safety and independence with ADL routine. Pt demonstrates 5/5 strength in major muscle groups in B UEs. He was able to complete some of the fugl royal assessment, however he is hard of hearing and was unable to complete as a result. Visual cues did not appear helpful either. He was unable to write his name clearly which daughter reports he can at baseline. He is a resident of the memory care unit at [facility], where he walks with a RW and has assist for bathing and dressing. He is MOD A for upper body ADLs, MAX A for lower body ADLs, MIN A for functional transfer. In sitting, pt with consistent drooling out the right side of his mouth, have alerted SLP and RN. Build up handles for pt to hopefully increase independence with eating. Recommend pt return to rehab at [address] to increase safety and independence with ADL routine. Patient will benefit from skilled intervention to address the above impairments.   Patients rehabilitation potential is considered to be Good  Factors which may influence rehabilitation potential include:   [ ]                None noted  [ ]                Mental ability/status  [X]                Medical condition  [ ]                Home/family situation and support systems  [X]                Safety awareness  [ ]                Pain tolerance/management  [ ]                Other:        PLAN :  Recommendations and Planned Interventions:  [X]                  Self Care Training                  [X]           Therapeutic Activities  [X]                  Functional Mobility Training    [ ]           Cognitive Retraining  [X]                  Therapeutic Exercises           [X]           Endurance Activities  [X]                  Balance Training                   [ ]           Neuromuscular Re-Education  [ ]                  Visual/Perceptual Training     [X]      Home Safety Training  [X]                  Patient Education                 [X]           Family Training/Education  [ ]                  Other (comment):     Frequency/Duration: Patient will be followed by occupational therapy 5 times a week to address goals. Discharge Recommendations: Rehab  Further Equipment Recommendations for Discharge: TBD       SUBJECTIVE:   Patient stated I'm I just tired, I was up all night! Prem Sears pt making excuses at to why he could do activities      OBJECTIVE DATA SUMMARY:   HISTORY:   Past Medical History   Diagnosis Date    Hypertension      Memory impairment     History reviewed. No pertinent past surgical history. Prior Level of Function/Home Situation: He is a resident of the memory care unit at [facility], where he walks with a RW and has assist for bathing and dressing.      Home Situation  Home Environment: [address] [place] [address] Name: barrett hernandez  One/Two Story Residence: One story  Living Alone: No  Support Systems: Skilled nursing facility  Patient Expects to be Discharged to[de-identified] Skilled nursing facility  Current DME Used/Available at Home: None  [X]  Right hand dominant             [ ]  Left hand dominant     EXAMINATION OF PERFORMANCE DEFICITS:  Cognitive/Behavioral Status:  Neurologic State: Alert  Orientation Level: Oriented to person  Cognition: Decreased command following  Perception: Appears intact  Perseveration: No perseveration noted  Safety/Judgement: Awareness of environment; Fall prevention;Home safety  Skin: tears noted on B Ues  Edema: None observed  Vision/Perceptual:    Tracking: Able to track stimulus in all quadrants w/o difficulty                 Diplopia: No            Range of Motion:     AROM: Within functional limits                       Strength:     Strength: Within functional limits     RUE Strength  R Shoulder Flexion: 5  R Shoulder ABduction: 5  R Elbow Flexion: 5  R Elbow Extension: 5  R : 5     LUE Strength  L Shoulder Flexion: 5  L Shoulder ABduction: 5  L Elbow Flexion: 5  L Elbow Extension: 5  L : 5  Coordination:  Coordination: Generally decreased, functional  Fine Motor Skills-Upper: Left Intact; Right Intact    Gross Motor Skills-Upper: Left Intact; Right Intact     Balance:  Sitting: Intact  Standing: Impaired  Standing - Static: Fair  Standing - Dynamic : Fair     Functional Mobility and Transfers for ADLs:  Bed Mobility:  Supine to Sit: Contact guard assistance  Sit to Supine: Contact guard assistance     Transfers:  Sit to Stand: Minimum assistance;Assist x1;Additional time  Stand to Sit: Minimum assistance;Assist x1;Additional time     ADL Assessment:  Feeding: Moderate assistance (coordination impairments in R UE)     Oral Facial Hygiene/Grooming: Moderate assistance (coordination impairments in R UE)     Bathing:  Moderate assistance (a to reach LEs, backside in standing)     Upper Body Dressing: Minimum assistance     Lower Body Dressing: Maximum assistance (A to reach LEs, clothing management in standing)     Toileting: Maximum assistance (A for clothing management, hygiene)                 ADL Intervention and task modifications:           Cognitive Retraining  Safety/Judgement: Awareness of environment; Fall prevention;Home safety     Functional Measure:   Fugl-Ch Assessment of Motor Recovery after Stroke:   Score not reflective of actual ability, score impacted by decreased command follow  Reflex Activity  Flexors/Biceps/Fingers: Can be elicited  Extensors/Triceps: Can be elicited  Reflex Subtotal: 4     Volitional Movement Within Synergies  Shoulder Retraction: Full  Shoulder Elevation: Full  Shoulder Abduction (90 degrees): Full  Shoulder External Rotation: Full  Elbow Flexion: Full  Forearm Supination: Full  Shoulder Adduction/Internal Rotation: Full  Elbow Extension: Full  Forearm Pronation: Full  Subtotal: 18     Volitional Movement Mixing Synergies  Hand to Lumbar Spine: None (d/t decreased command follow)  Shoulder Flexion (0-90 degrees): None  Pronation-Supination: None  Subtotal: 0     Volitional Movement With Little or No Synergy  Shoulder Abduction (0-90 degrees): Full  Shoulder Flexion ( degrees): Full  Pronation/Supination: Full  Subtotal : 6     Normal Reflex Activity  Biceps, Triceps, Finger Flexors:  Full  Subtotal : 2     Upper Extremity Total   Upper Extremity Total: 30     Wrist  Stability at 15 Degree Dorsiflexion: None (score reflext decreased command follow)  Repeated Dorsiflexion/ [place] Flexion: None  Stability at 15 Degree Dorsiflexion: None  Repeated Dorsiflexion/ [place] Flexion: None  Circumduction: None  Wrist Total: 0     Hand  Mass Flexion: Full  Mass Extension: Full  Grasp A: Partial  Grasp B: Partial  Grasp C: Partial  Grasp D: Partial  Grasp E: Partial  Hand Total: 9     Coordination/Speed  Tremor: Marked (unable to complete d/t decreased command follow)  Dysmetria: Marked  Time: >5s  Coordination/Speed Total : 0     Total A-D  Total A-D (Motor Function): 39/66      Percentage of impairment CH  0% CI  1-19% CJ  20-39% CK  40-59% CL  60-79% CM  80-99% CN  100% Fugl-Royal score: 0-66 66 53-65 39-52 26-38 13-25 1-12    0      This is a reliable/valid measure of arm function after a neurological event. It has established value to characterize functional status and for measuring spontaneous and therapy-induced recovery; tests proximal and distal motor functions. Fugl-Royal Assessment  UE scores recorded between five and 30 days post neurologic event can be used to predict UE recovery at six months post neurologic event. Severe = 0-21 points   Moderately Severe = 22-33 points   Moderate = 34-47 points   Mild = 48-66 points  ERIC Lema, JOSEPH Odom, & ALEXX Mlcean (1992). Measurement of motor recovery after stroke: Outcome assessment and sample size requirements. Stroke, 23, pp. 5766-7172.   ------------------------------------------------------------------------------------------------------------------------------------------------------------------  MCID:  Stroke:   Venkat Thomason et al, 2001; n = 171; mean age 79 (5) years; assessed within 16 (12) days of stroke, Acute Stroke)  FMA Motor Scores from Admission to Discharge   · 10 point increase in FMA Upper Extremity = 1.5 change in discharge FIM  · 10 point increase in FMA Lower Extremity = 1.9 change in discharge FIM  MDC:   Stroke:   Bala Kirkland et al, 2008, n = 14, mean age = 59.9 (14.6) years, assessed on average 14 (6.5) months post stroke, Chronic Stroke)   · FMA = 5.2 points for the Upper Extremity portion of the assessment      G codes: In compliance with CMSs Claims Based Outcome Reporting, the following G-code set was chosen for this patient based on their primary functional limitation being treated: The outcome measure chosen to determine the severity of the functional limitation was the fugl royal  with a score of 39/66 which was correlated with the impairment scale.       · Self Care:               - CURRENT STATUS:    CJ - 20%-39% impaired, limited or restricted  - GOAL STATUS:           CI - 1%-19% impaired, limited or restricted               - D/C STATUS:                       ---------------To be determined---------------       Occupational Therapy Evaluation Charge Determination   History Examination Decision-Making   LOW Complexity : Brief history review  MEDIUM Complexity : 3-5 performance deficits relating to physical, cognitive , or psychosocial skils that result in activity limitations and / or participation restrictions MEDIUM Complexity : Patient may present with comorbidities that affect occupational performnce. Miniml to moderate modification of tasks or assistance (eg, physical or verbal ) with assesment(s) is necessary to enable patient to complete evaluation       Based on the above components, the patient evaluation is determined to be of the following complexity level: MEDIUM     Pain:  Pain Scale 1: Numeric (0 - 10)  Pain Intensity 1: 0              Activity Tolerance:   Pt tolerated all activity without s/s of distress. Please refer to the flowsheet for vital signs taken during this treatment. After treatment:   [ ]  Patient left in no apparent distress sitting up in chair  [X]  Patient left in no apparent distress in bed  [X]  Call bell left within reach  [X]  Nursing notified  [ ]  Caregiver present  [ ]  Bed alarm activated      COMMUNICATION/EDUCATION:   The patients plan of care was discussed with: Physical Therapist and Registered Nurse. Patient was educated regarding His deficit(s) of r coordination deficits as this relates to His diagnosis of acute CVA. He demonstrated Fair understanding as evidenced by his ability to verbalize deficits and reasons for them. [X]    Home safety education was provided and the patient/caregiver indicated understanding. [X]    Patient/family have participated as able in goal setting and plan of care. [X]    Patient/family agree to work toward stated goals and plan of care.   [ ]    Patient understands intent and goals of therapy, but is neutral about his/her participation. [ ]    Patient is unable to participate in goal setting and plan of care. This patients plan of care is appropriate for delegation to LINDA.      Thank you for this referral.  Lynn Puga OTR/L     Time Calculation: 32 mins

## 2017-01-18 NOTE — CONSULTS
STROKE/TRANSIENT ISCHEMIC ATTACK CONSULT NOTE    Patient ID:  Dontrell Vora  [id number]  31 y.o.  6/2/1925    Date of Consultation:  January 18, 2017    Reason for Consultation: Stroke, R-HP    History of Present Illness:     Patient Active Problem List    Diagnosis Date Noted    Acute ischemic stroke ([facility] 01/18/2017    Acute CVA (cerebrovascular accident) ([facility] 01/18/2017    TIA (transient ischemic attack) 01/17/2017    HTN (hypertension) 11/08/2016    BPH (benign prostatic hyperplasia) 11/08/2016    Dementia 11/08/2016    SDH (subdural hematoma) ([facility] 11/06/2016     Past Medical History   Diagnosis Date    Hypertension     Memory impairment       History reviewed. No pertinent past surgical history. Prior to Admission medications    Medication Sig Start Date End Date Taking? Authorizing Provider   acetaminophen (TYLENOL EXTRA STRENGTH) 500 mg tablet Take 1,000 mg by mouth two (2) times a day. Yes Historical Provider   acetaminophen (TYLENOL EXTRA STRENGTH) 500 mg tablet Take 500 mg by mouth every four (4) hours as needed for Pain. Yes Historical Provider   amLODIPine (NORVASC) 10 mg tablet Take 10 mg by mouth daily. Indications: Hypertension   Yes Historical Provider   donepezil (ARICEPT) 10 mg tablet Take 10 mg by mouth nightly. Yes Historical Provider   cholecalciferol, vitamin D3, (VITAMIN D3) 2,000 unit tab Take 1 Tab by mouth daily. Yes Historical Provider   escitalopram oxalate (LEXAPRO) 10 mg tablet Take 10 mg by mouth daily. Yes Historical Provider   melatonin 3 mg tablet Take 3 mg by mouth nightly. Yes Historical Provider   memantine ER (NAMENDA XR) 28 mg capsule Take 28 mg by mouth daily. Yes Historical Provider   QUEtiapine (SEROQUEL) 25 mg tablet Take 12.5 mg by mouth two (2) times daily as needed. Yes Historical Provider   tamsulosin (FLOMAX) 0.4 mg capsule Take 0.4 mg by mouth daily.  Indications: SYMPTOMATIC BENIGN PROSTATIC HYPERPLASIA   Yes Historical Provider     No Known Allergies   Social History   Substance Use Topics    Smoking status: Current Some Day Smoker    Smokeless tobacco: Not on file      Comment: [address] Alcohol use Yes      History reviewed. No pertinent family history. Subjective:      Иван Palmer is a 80 y.o.  right handed male I have been asked to consult for evaluation of and treatment of stroke. Pt resides at [address] [place] [address] living [facility]. History obtained from EMR as patient cannot provide additional details. PMH notable for HTN, dementia, chronic SDH. He was noted to have difficulty using his utensils with the RUE while eating on 1/16/17 along with right facial droop. He was not on antiplatelet or [address] prior to admission. MRI Brain reviewed and shows scattered L frontal/parietal/occipital ischemic infarction along with chronic SDH. Review of Systems:  Unable to obtain due to mental status    Objective:     Patient Vitals for the past 8 hrs:   BP Temp Pulse Resp SpO2   01/18/17 1500 133/47 97.5 °F (36.4 °C) 68 15 99 %   01/18/17 1328 - - 64 - -   01/18/17 1100 174/67 97.9 °F (36.6 °C) (!) 59 18 98 %           PHYSICAL EXAM:    General Appearance: Alert, patient appears stated age. General:  patient in no apparent distress. Head/Face: The head is normocephalic and atraumatic. Eyes: Conjunctivae appear normal. Sclera appear normal and non-icteric. Nose (and Sinus):   No abnormality of the nose or sinuses is noted. Oral:   Throat is clear. Lymphatics:  No lymphadenopathy in the neck/head. Neck and Thyroid:   No bruits noted in the neck. Respiratory:  Lungs clear to auscultation. Cardiovascular:  Palpation and auscultation: regular rate and rhythm. Abdomen: Soft, non-tender, without masses, hernias or bruits. Bowel sounds are active in all four quadrants.    Musculoskeletal/Skin: Head/Neck (Posterior)/Shoulder Girdle: No tenderness to palpation; full, painless range of motion of the neck.   Edema/Varicosities of Extremities: No joint swelling, erythemia or pedal edema. NEUROLOGICAL EXAM:    Appearance: The patient is well developed, NAD   Mental Status: Oriented to person only. Follows all commands. Cranial Nerves:   Intact visual fields. PERRLA, EOM's full, no nystagmus, no ptosis. Facial sensation is normal. Facial movement is symmetric. Hearing is normal bilaterally. Palate is midline with normal sternocleidomastoid and trapezius muscles are normal. Tongue is midline. Motor:  5-/5 RUE, 5/5 LUE, b/l LE 5/5   Reflexes:   Deep tendon reflexes 2+/4 and symmetrical. Plantar response is equivocal b/l due to withdrawal.   Sensory:   Normal throughout   Gait:  Deferred   Tremor:   No tremor noted. Cerebellar:  No cerebellar signs present. Neurovascular:  Normal heart sounds and regular rhythm, peripheral pulses intact, and no carotid bruits. Imaging  CT Results (maximum last 3): Results from Hospital Encounter encounter on 01/17/17   CT HEAD WO CONT   Narrative EXAM:  CT HEAD WO CONT    INDICATION:   Stroke; Stroke right-sided weakness   COMPARISON: November 2016. TECHNIQUE: Unenhanced CT of the head was performed using 5 mm images. Brain and  bone windows were generated. CT dose reduction was achieved through use of a  standardized protocol tailored for this examination and automatic exposure  control for dose modulation. FINDINGS:  There is diffuse atrophy and white matter disease. There is no extra-axial fluid  collection acute hemorrhage or shift. No masses or. Focal low density in the  left parietal lobe is likely ischemic and possibly chronic. Impression impression: No hemorrhage or mass effect. Results from [place] encounter on 11/06/16   CT HEAD WO CONT   Narrative EXAM:  CT HEAD WO CONT  Coronal history: Subdural hemorrhage      INDICATION:   SDH    COMPARISON: 11/6/2016.     TECHNIQUE: Unenhanced CT of the head was performed using 5 mm images. Brain and  bone windows were generated. CT dose reduction was achieved through use of a  standardized protocol tailored for this examination and automatic exposure  control for dose modulation. FINDINGS:  Left parietal extra-axial fluid collection is slightly diminished/unchanged. Density is primarily at hypodense. Deep margins are slightly hyperdense. There  is no change in the mass effect on the left parietal lobe. Unchanged left to  right subfalcine midline shift measures 2 mm. No intra-axial hemorrhage. There is no abnormal area of decreased density to  suggest infarct. Sulcal and ventricular prominence. Scattered hypodensities in  the cerebral white matter. The calvarium is intact. The visualized paranasal  sinuses and mastoid air cells are clear. Impression IMPRESSION:     Left parietal extra-axial fluid collection is likely subdural but not acute. Mild mass effect and midline shift, slightly diminished/unchanged. CT HEAD WO CONT   Narrative EXAM:  CT HEAD WO CONT    INDICATION: New onset left foot weakness. White blood cell count of 4. COMPARISON: CT head on 8/15/2016    TECHNIQUE: Noncontrast head CT. Coronal and sagittal reformats. CT dose  reduction was achieved through the use of a standardized protocol tailored for  this examination and automatic exposure control for dose modulation. FINDINGS: Left parietal extra-axial fluid collection measures 14 mm in depth. Density is primarily at hypodense. Deep margins are slightly hyperdense. There  is mass effect on the left parietal lobe. Left right subfalcine midline shift  measures 2 mm. The collection does not cross the suture. No intra-axial hemorrhage. There is no abnormal area of decreased density to  suggest infarct. The calvarium is intact. The visualized paranasal sinuses and mastoid air cells  are clear.          Impression IMPRESSION:     Left parietal extra-axial fluid collection is either epidural or subdural but  nonacute. Mild mass effect and midline shift. Hematoma is favored over infection  given the normal white blood cell count. This finding would not explain left  foot weakness. I discussed this impression with Mitzi Arzate at 2125 hours on 11/6/2016. MRI Results (maximum last 3): Results from [place] encounter on 01/17/17   MRI BRAIN WO CONT   Narrative EXAM:  MRI BRAIN WO CONT  Clinical history:80 y.o. male who presents with weakness. Patient has the H/O  memory impairment, HTN. He lives at [facility]. According to the patient  daughter, patient was unable to hold his eating utensils with the right hand  last night and he also had right facial droop. Katherene Means He said he is fine now. INDICATION:  TIA    COMPARISON: CT 1/17/2017. CONTRAST:  None. TECHNIQUE: Sagittal T1, axial FLAIR, T2,T1 and gradient echo images as well as  coronal T2 weighted images and axial diffusion weighted images of the head were  obtained. FINDINGS: There is an acute infarction in the posterior left frontal lobe and  also in the left occipital lobe with scattered foci of infarction in the left  parietal lobe as well. Foci of infarction are predominantly cortical based. The  focus of infarction in the posterior left frontal lobe is moderate in size other  foci are small. Trace subdural hemorrhage overlying the left parietal lobe. .     There are sulcal and ventricular prominence. Confluent periventricular and  scattered foci of increased T2 signal intensity in the corona radiata and  centrum semiovale. Atypical appearance of the carotid flow voids in the  cavernous sinuses The craniocervical junction is normal. The structures at the  cranial base including paranasal sinuses and mastoid air cells are unremarkable. Impression IMPRESSION:    Scattered acute cerebral infarctions.  There is a moderate sized infarction in  the posterior left frontal lobe with additional scattered foci of infarction in  the left parietal lobe and left occipital lobe. Trace chronic subdural  hemorrhage. There is mild/moderate cerebral atrophy for patient age. There is a degree of atherosclerotic change in the cavernous carotid artery on  the left. Neurovascular imaging as clinically warranted. There is no hemorrhage or mass effect. The findings were called to Dr. Kennedy Roe clinical care associate in the neuro  ICU at [facility] on 1/18/2017 at 8:48 AM by Dr. Nikki Naik. [phone]            VAS/US Results (maximum last 3): No results found for this or any previous visit. Lab Review    Recent Results (from the past 24 hour(s))   CBC WITH AUTOMATED DIFF    Collection Time: 01/17/17  5:11 PM   Result Value Ref Range    WBC 5.9 4.1 - 11.1 K/uL    RBC 3.74 (L) 4.10 - 5.70 M/uL    HGB 11.1 (L) 12.1 - 17.0 g/dL    HCT 33.9 (L) 36.6 - 50.3 %    MCV 90.6 80.0 - 99.0 FL    MCH 29.7 26.0 - 34.0 PG    MCHC 32.7 30.0 - 36.5 g/dL    RDW 14.2 11.5 - 14.5 %    PLATELET 421 475 - 967 K/uL    NEUTROPHILS 73 32 - 75 %    LYMPHOCYTES 17 12 - 49 %    MONOCYTES 8 5 - 13 %    EOSINOPHILS 2 0 - 7 %    BASOPHILS 0 0 - 1 %    ABS. NEUTROPHILS 4.3 1.8 - 8.0 K/UL    ABS. LYMPHOCYTES 1.0 0.8 - 3.5 K/UL    ABS. MONOCYTES 0.5 0.0 - 1.0 K/UL    ABS. EOSINOPHILS 0.1 0.0 - 0.4 K/UL    ABS. BASOPHILS 0.0 0.0 - 0.1 K/UL   METABOLIC PANEL, COMPREHENSIVE    Collection Time: 01/17/17  5:11 PM   Result Value Ref Range    Sodium 142 136 - 145 mmol/L    Potassium 4.7 3.5 - 5.1 mmol/L    Chloride 107 97 - 108 mmol/L    CO2 24 21 - 32 mmol/L    Anion gap 11 5 - 15 mmol/L    Glucose 105 (H) 65 - 100 mg/dL    BUN 48 (H) 6 - 20 MG/DL    Creatinine 2.03 (H) 0.70 - 1.30 MG/DL    BUN/Creatinine ratio 24 (H) 12 - 20      GFR est AA 38 (L) >60 ml/min/1.73m2    GFR est non-AA 31 (L) >60 ml/min/1.73m2    Calcium 8.7 8.5 - 10.1 MG/DL    Bilirubin, total 0.7 0.2 - 1.0 MG/DL    ALT 22 12 - 78 U/L    AST 16 15 - 37 U/L    Alk.  phosphatase 80 45 - 117 U/L    Protein, total 7.5 6.4 - 8.2 g/dL    Albumin 3.8 3.5 - 5.0 g/dL    Globulin 3.7 2.0 - 4.0 g/dL    A-G Ratio 1.0 (L) 1.1 - 2.2     CK W/ CKMB & INDEX    Collection Time: 01/17/17  5:11 PM   Result Value Ref Range     39 - 308 U/L    CK - MB 6.2 (H) <3.6 NG/ML    CK-MB Index 2.0 0 - 2.5     PROTHROMBIN TIME + INR    Collection Time: 01/17/17  5:11 PM   Result Value Ref Range    INR 1.0 0.9 - 1.1      Prothrombin time 10.4 9.0 - 11.1 sec   PTT    Collection Time: 01/17/17  5:11 PM   Result Value Ref Range    aPTT 27.5 22.1 - 32.5 sec    aPTT, therapeutic range     58.0 - 77.0 SECS   TROPONIN I    Collection Time: 01/17/17  5:11 PM   Result Value Ref Range    Troponin-I, Qt. <0.04 <0.05 ng/mL   EKG, 12 LEAD, INITIAL    Collection Time: 01/17/17  5:50 PM   Result Value Ref Range    Ventricular Rate 64 BPM    Atrial Rate 64 BPM    P-R Interval 220 ms    QRS Duration 84 ms    Q-T Interval 422 ms    QTC Calculation (Bezet) 435 ms    Calculated P Axis 63 degrees    Calculated R Axis -5 degrees    Calculated T Axis 68 degrees    Diagnosis       Sinus rhythm with 1st degree AV block  No previous ECGs available  Confirmed by Coletta Krabbe, M.D., [facility] ([id number]) on 1/18/2017 12:01:01 AM     LIPID PANEL    Collection Time: 01/18/17  4:08 AM   Result Value Ref Range    LIPID PROFILE          Cholesterol, total 147 <200 MG/DL    Triglyceride 77 <150 MG/DL    HDL Cholesterol 57 MG/DL    LDL, calculated 74.6 0 - 100 MG/DL    VLDL, calculated 15.4 MG/DL    CHOL/HDL Ratio 2.6 0 - 5.0     HEMOGLOBIN A1C WITH EAG    Collection Time: 01/18/17  4:08 AM   Result Value Ref Range    Hemoglobin A1c 5.2 4.2 - 6.3 %    Est. average glucose 103 mg/dL   CBC W/O DIFF    Collection Time: 01/18/17  4:08 AM   Result Value Ref Range    WBC 5.2 4.1 - 11.1 K/uL    RBC 3.84 (L) 4.10 - 5.70 M/uL    HGB 11.1 (L) 12.1 - 17.0 g/dL    HCT 34.5 (L) 36.6 - 50.3 %    MCV 89.8 80.0 - 99.0 FL    MCH 28.9 26.0 - 34.0 PG    MCHC 32.2 30.0 - 36.5 g/dL RDW 14.5 11.5 - 14.5 %    PLATELET 360 603 - 881 K/uL   TSH 3RD GENERATION    Collection Time: 01/18/17  4:08 AM   Result Value Ref Range    TSH 5.32 (H) 0.36 - 3.74 uIU/mL   SED RATE (ESR)    Collection Time: 01/18/17  4:08 AM   Result Value Ref Range    Sed rate, automated 14 0 - 20 mm/hr   CRP, HIGH SENSITIVITY    Collection Time: 01/18/17  4:08 AM   Result Value Ref Range    CRP, High sensitivity 3.0 mg/L   METABOLIC PANEL, BASIC    Collection Time: 01/18/17  4:08 AM   Result Value Ref Range    Sodium 141 136 - 145 mmol/L    Potassium 4.1 3.5 - 5.1 mmol/L    Chloride 109 (H) 97 - 108 mmol/L    CO2 21 21 - 32 mmol/L    Anion gap 11 5 - 15 mmol/L    Glucose 94 65 - 100 mg/dL    BUN 41 (H) 6 - 20 MG/DL    Creatinine 1.63 (H) 0.70 - 1.30 MG/DL    BUN/Creatinine ratio 25 (H) 12 - 20      GFR est AA 48 (L) >60 ml/min/1.73m2    GFR est non-AA 40 (L) >60 ml/min/1.73m2    Calcium 8.6 8.5 - 10.1 MG/DL   GLUCOSE, POC    Collection Time: 01/18/17  9:23 AM   Result Value Ref Range    Glucose (POC) 90 65 - 100 mg/dL    Performed by [facility]          Assessment:     Active Problems:    TIA (transient ischemic attack) (1/17/2017)      Acute ischemic stroke (HCC) (1/18/2017)      Acute CVA (cerebrovascular accident) ([facility] (1/18/2017)    81 yo RHM h/o HTN, dementia presenting with acute right hemiparesis and confirmed scattered multi-territory infarcts over the L hemisphere, possibly embolic. MRI Brain also shows moderate atrophy and chronic L parietal SDH. He is not a candidate for [facility] should a central source be identified due to age, fall risk, SDH. Plan:   Agree with ASA 325mg daily  Start low dose statin therapy, goal LDL<70  CUS  Stroke education  PT/OT  Neuro F/U 4-6 weeks  Please call with further questions/concerns        Stroke 8  Stroke Prevention:  1. Antithrombotic therapy: ASA 325mg daily  2. Statin therapy: Goal LDL<70  Lab Results   Component Value Date/Time    LDL, calculated 74.6 01/18/2017 04:08 AM   3. Blood pressure control:    Optimum range: SBP<140  Treatment range for PRN medications: SBP>140  4. Glycemic control: Goal HbA1C<7.0  No results found for this basename: Lab Results   Component Value Date/Time    Hemoglobin A1c 5.2 01/18/2017 04:08 AM         Prevention of Complications:  5. DVT prophylaxis: ICDs  6. Temperature: afebrile  7. Fluids and nutrition: Swallow evaluation    Recovery and Disposition:  8. Mobility and therapy: PT/OT evaluation      Thank you very much for this referral. I appreciate the opportunity to participate in this patient's care. Signed:  Edgar Parada.  Pedro Carey DO  1/18/2017  3:47 PM

## 2017-01-18 NOTE — PROGRESS NOTES
Admission Medication Reconciliation:    Information obtained from: Medication list from [facility] (to be scanned into chart)    Significant PMH/Disease States:   Past Medical History   Diagnosis Date    Hypertension     Memory impairment        Chief Complaint for this Admission:    Chief Complaint   Patient presents with    Extremity Weakness       Allergies:  Review of patient's allergies indicates no known allergies. Prior to Admission Medications:   Prior to Admission Medications   Prescriptions Last Dose Informant Patient Reported? Taking? QUEtiapine (SEROQUEL) 25 mg tablet   Yes Yes   Sig: Take 12.5 mg by mouth two (2) times daily as needed. acetaminophen (TYLENOL EXTRA STRENGTH) 500 mg tablet   Yes Yes   Sig: Take 1,000 mg by mouth two (2) times a day. acetaminophen (TYLENOL EXTRA STRENGTH) 500 mg tablet   Yes Yes   Sig: Take 500 mg by mouth every four (4) hours as needed for Pain. amLODIPine (NORVASC) 10 mg tablet   Yes Yes   Sig: Take 10 mg by mouth daily. Indications: Hypertension   cholecalciferol, vitamin D3, (VITAMIN D3) 2,000 unit tab   Yes Yes   Sig: Take 1 Tab by mouth daily. donepezil (ARICEPT) 10 mg tablet   Yes Yes   Sig: Take 10 mg by mouth nightly. escitalopram oxalate (LEXAPRO) 10 mg tablet   Yes Yes   Sig: Take 10 mg by mouth daily. melatonin 3 mg tablet   Yes Yes   Sig: Take 3 mg by mouth nightly. memantine ER (NAMENDA XR) 28 mg capsule   Yes Yes   Sig: Take 28 mg by mouth daily. tamsulosin (FLOMAX) 0.4 mg capsule   Yes Yes   Sig: Take 0.4 mg by mouth daily. Indications: SYMPTOMATIC BENIGN PROSTATIC HYPERPLASIA      Facility-Administered Medications: None         Comments/Recommendations:     Obtained medication list from [facility] to update PTA medication list.    Modified APAP.       Sonam Ventura, PharmD

## 2017-01-18 NOTE — H&P
Sabra Beltran MD  Please call  and page for questions. Call physician on-call through the  7pm-7am      History & Physical    Primary Care Provider: Shahrzad Nguyen MD  Source of Information: Patient and his medical record. History of Presenting Illness:   Sanam Barclay is a 80 y.o. male who presents with weakness. Patient has the H/O memory impairment, HTN. He lives at [facility]. According to the patient daughter, patient was unable to hold his eating utensils with the right hand last night and he also had right facial droop. Patient thinsk he has stroke. He said he is fine now. He does not have any of those problems when I saw him at the ED. Patient was given tylenol at the facility. Patient family were not at the bedside when I reached to the patient. The patient denies any fever, chills, chest pain, cough, congestion, recent illness, palpitations, or dysuria. Review of Systems:  A comprehensive review of systems was negative except for that written in the History of Present Illness. Past Medical History   Diagnosis Date    Hypertension     Memory impairment       History reviewed. No pertinent past surgical history. Prior to Admission medications    Medication Sig Start Date End Date Taking? Authorizing Provider   acetaminophen (TYLENOL EXTRA STRENGTH) 500 mg tablet Take 1,000 mg by mouth two (2) times a day. Yes Historical Provider   acetaminophen (TYLENOL EXTRA STRENGTH) 500 mg tablet Take 500 mg by mouth every four (4) hours as needed for Pain. Yes Historical Provider   amLODIPine (NORVASC) 10 mg tablet Take 10 mg by mouth daily. Indications: Hypertension   Yes Historical Provider   donepezil (ARICEPT) 10 mg tablet Take 10 mg by mouth nightly. Yes Historical Provider   cholecalciferol, vitamin D3, (VITAMIN D3) 2,000 unit tab Take 1 Tab by mouth daily.    Yes Historical Provider   escitalopram oxalate (LEXAPRO) 10 mg tablet Take 10 mg by mouth daily. Yes Historical Provider   melatonin 3 mg tablet Take 3 mg by mouth nightly. Yes Historical Provider   memantine ER (NAMENDA XR) 28 mg capsule Take 28 mg by mouth daily. Yes Historical Provider   QUEtiapine (SEROQUEL) 25 mg tablet Take 12.5 mg by mouth two (2) times daily as needed. Yes Historical Provider   tamsulosin (FLOMAX) 0.4 mg capsule Take 0.4 mg by mouth daily. Indications: SYMPTOMATIC BENIGN PROSTATIC HYPERPLASIA   Yes Historical Provider     No Known Allergies   History reviewed. No pertinent family history. SOCIAL HISTORY:  Patient resides:  Independently    Assisted Living X   SNF    With family care       Smoking history:   None X   Former    Chronic      Alcohol history:   None X   Social    Chronic      Ambulates:   Independently    w/cane    w/walker    w/wc    CODE STATUS:  DNR    Full X   Other      Objective:     Physical Exam:     Visit Vitals    /66    Pulse 74    Temp 97.8 °F (36.6 °C)    Resp 20    Ht 5' 9\" (1.753 m)    Wt 63.2 kg (139 lb 5.3 oz)    SpO2 99%    BMI 20.58 kg/m2      O2 Device: Room air    General:  Alert, cooperative, no distress, appears stated age. Head:  Normocephalic, without obvious abnormality, atraumatic. Neck: Supple, symmetrical, trachea midline. Back:   Symmetric, no curvature. ROM normal. No CVA tenderness. Lungs:   Clear to auscultation bilaterally. Chest wall:  No tenderness or deformity. Heart:  Regular rate and rhythm, S1, S2 normal, no murmur. Abdomen:   Soft, non-tender. Bowel sounds normal.    Extremities: Extremities normal, atraumatic, no cyanosis or edema. Pulses: 2+ and symmetric all extremities. Skin: Skin color, texture, turgor normal. No rashes or lesions   Neurologic: CNII-XII intact.             Data Review:     Recent Days:  Recent Labs      01/17/17   1711   WBC  5.9   HGB  11.1*   HCT  33.9*   PLT  167     Recent Labs      01/17/17   1711   NA  142   K  4.7   CL  107   CO2  24   GLU 105*   BUN  48*   CREA  2.03*   CA  8.7   ALB  3.8   SGOT  16   ALT  22   INR  1.0     No results for input(s): PH, PCO2, PO2, HCO3, FIO2 in the last 72 hours. 24 Hour Results:  Recent Results (from the past 24 hour(s))   CBC WITH AUTOMATED DIFF    Collection Time: 01/17/17  5:11 PM   Result Value Ref Range    WBC 5.9 4.1 - 11.1 K/uL    RBC 3.74 (L) 4.10 - 5.70 M/uL    HGB 11.1 (L) 12.1 - 17.0 g/dL    HCT 33.9 (L) 36.6 - 50.3 %    MCV 90.6 80.0 - 99.0 FL    MCH 29.7 26.0 - 34.0 PG    MCHC 32.7 30.0 - 36.5 g/dL    RDW 14.2 11.5 - 14.5 %    PLATELET 263 021 - 834 K/uL    NEUTROPHILS 73 32 - 75 %    LYMPHOCYTES 17 12 - 49 %    MONOCYTES 8 5 - 13 %    EOSINOPHILS 2 0 - 7 %    BASOPHILS 0 0 - 1 %    ABS. NEUTROPHILS 4.3 1.8 - 8.0 K/UL    ABS. LYMPHOCYTES 1.0 0.8 - 3.5 K/UL    ABS. MONOCYTES 0.5 0.0 - 1.0 K/UL    ABS. EOSINOPHILS 0.1 0.0 - 0.4 K/UL    ABS. BASOPHILS 0.0 0.0 - 0.1 K/UL   METABOLIC PANEL, COMPREHENSIVE    Collection Time: 01/17/17  5:11 PM   Result Value Ref Range    Sodium 142 136 - 145 mmol/L    Potassium 4.7 3.5 - 5.1 mmol/L    Chloride 107 97 - 108 mmol/L    CO2 24 21 - 32 mmol/L    Anion gap 11 5 - 15 mmol/L    Glucose 105 (H) 65 - 100 mg/dL    BUN 48 (H) 6 - 20 MG/DL    Creatinine 2.03 (H) 0.70 - 1.30 MG/DL    BUN/Creatinine ratio 24 (H) 12 - 20      GFR est AA 38 (L) >60 ml/min/1.73m2    GFR est non-AA 31 (L) >60 ml/min/1.73m2    Calcium 8.7 8.5 - 10.1 MG/DL    Bilirubin, total 0.7 0.2 - 1.0 MG/DL    ALT 22 12 - 78 U/L    AST 16 15 - 37 U/L    Alk.  phosphatase 80 45 - 117 U/L    Protein, total 7.5 6.4 - 8.2 g/dL    Albumin 3.8 3.5 - 5.0 g/dL    Globulin 3.7 2.0 - 4.0 g/dL    A-G Ratio 1.0 (L) 1.1 - 2.2     CK W/ CKMB & INDEX    Collection Time: 01/17/17  5:11 PM   Result Value Ref Range     39 - 308 U/L    CK - MB 6.2 (H) <3.6 NG/ML    CK-MB Index 2.0 0 - 2.5     PROTHROMBIN TIME + INR    Collection Time: 01/17/17  5:11 PM   Result Value Ref Range    INR 1.0 0.9 - 1.1      Prothrombin time 10.4 9.0 - 11.1 sec   PTT    Collection Time: 01/17/17  5:11 PM   Result Value Ref Range    aPTT 27.5 22.1 - 32.5 sec    aPTT, therapeutic range     58.0 - 77.0 SECS   TROPONIN I    Collection Time: 01/17/17  5:11 PM   Result Value Ref Range    Troponin-I, Qt. <0.04 <0.05 ng/mL   EKG, 12 LEAD, INITIAL    Collection Time: 01/17/17  5:50 PM   Result Value Ref Range    Ventricular Rate 64 BPM    Atrial Rate 64 BPM    P-R Interval 220 ms    QRS Duration 84 ms    Q-T Interval 422 ms    QTC Calculation (Bezet) 435 ms    Calculated P Axis 63 degrees    Calculated R Axis -5 degrees    Calculated T Axis 68 degrees    Diagnosis       Sinus rhythm with 1st degree AV block  No previous ECGs available           Imaging:     Assessment and Plan:       Weakness: Resolves as per patient. Proabaly TIA. Will admit and get MRI at AM. Will get PT/OT/SP and neurology consult. Ordered TTE. Hypertension: BP is reasonable. We will resume his home medications. Dementia; Moderate to severe. Patient know he is at the [facility], but he does not know what day is today. Other chronic medical issues: Continue home mediations. See orders for other plans:   VTE prophylaxis: Heparin  Code status: Patient said he would like to be resuscitated when I talked to him. Discussed plan of care with Patient/Family and Nurse   Discharge planning: pending.            Signed By: Clyde Jones MD     January 17, 2017

## 2017-01-18 NOTE — ED NOTES
Pt given applesauce and water for Dysphagia screening;  No difficulty noted with swallowing but pt did have difficulty with R hand dexterity when holding medication cup; Pt assisted with PO medication without difficulty

## 2017-01-18 NOTE — ED NOTES
Pt assisted with standing next to bed to use urinal; Unable to urinate @ this time;  Pt noted to have some difficulty standing on R leg but denies any weakness

## 2017-01-18 NOTE — PROGRESS NOTES
Occupational Therapy    Order received, chart reviewed. Code S called this morning 2* to slurred speech, decreased command following. MRI already positive for acute CVA. RN recommending therapy defer at this time. Will follow up as able and appropriate for therapy.  Pattie Nesbitt OTR/BASILIO

## 2017-01-18 NOTE — PROGRESS NOTES
2315: ER physician noted pt has a DNR, although orders were placed for pt to be [facility], NP paged; advised that she would pass information along in report to hospitalist team in the morning

## 2017-01-18 NOTE — PROGRESS NOTES
Spiritual Care Assessment/Progress Notes    Chandler Ortiz [id number]  [id number]    6/2/1925  80 y.o.  male    Patient Telephone Number: [phone] (home)   Jainism Affiliation: No preference   Language: English   Extended Emergency Contact Information  Primary Emergency Contact: Mraicel Galloway Phone: [phone]  Relation: Daughter   Patient Active Problem List    Diagnosis Date Noted    TIA (transient ischemic attack) 01/17/2017    HTN (hypertension) 11/08/2016    BPH (benign prostatic hyperplasia) 11/08/2016    Dementia 11/08/2016    [facility] 11/06/2016        Date: 1/18/2017       Level of Jainism/Spiritual Activity:  []         Involved in molly tradition/spiritual practice    []         Not involved in molly tradition/spiritual practice  [x]         Spiritually oriented    []         Claims no spiritual orientation    []         seeking spiritual identity  []         Feels alienated from Restorationism practice/tradition  []         Feels angry about Restorationism practice/tradition  [x]         Spirituality/Restorationism tradition is a resource for coping at this time.   []         Not able to assess due to medical condition    Services Provided Today:  [x]         crisis intervention    []         reading Scriptures  [x]         spiritual assessment    []         prayer  [x]         empathic listening/emotional support  []         rites and rituals (cite in comments)  []         life review     []         Restorationism support  []         theological development   []         advocacy  []         ethical dialog     []         blessing  []         bereavement support    [x]         support to family  []         anticipatory grief support   []         help with AMD  []         spiritual guidance    []         meditation      Spiritual Care Needs  [x]         Emotional Support  [x]         Spiritual/Jainism Care  []         Loss/Adjustment  [] Advocacy/Referral                /Ethics  []         No needs expressed at               this time  []         Other: (note in               comments)  Spiritual Care Plan  []         Follow up visits with               pt/family  []         Provide materials  []         Schedule sacraments  []         Contact Community               Clergy  [x]         Follow up as needed  []         Other: (note in               comments)     Responded to Code Stroke in 665. Met with patient's daughter who was sitting at bedside. Daughter appeared to be calm and composed. Led in processing. Daughter had brought patient to E.R. the previous night and shared that patient seems to be doing worse than when he came in. Stated that she along with her  are main support for patient. Explored spiritual emotional needs. Daughter identified patient as Temple. Asked if daughter would be open to visit from Clementina who is present today. Daughter agreed. Attempted to alert [facility], was not able to contact, will communicate desire at huddle. FARIDA Corona. Tete

## 2017-01-18 NOTE — PROGRESS NOTES
Physical Therapy  Order received & chart reviewed, including SLP note. Also of note, pt s/p Code S called this AM.  Per staff, MRI brain already positive for acute CVA, but Code S called due to further change in status. Will hold off on PT eval for this AM, and follow up later as able.   Daiana Le, PT, DPT

## 2017-01-18 NOTE — PROGRESS NOTES
Patient was reffered to me by staff. Daughter seemed appreciative of visit. Father recently moved here from out [address] [place] [address].  Family belongs to [place]. This stroke has been unnerving to say the least.  Daughter is very supportive but is the only caretaker available who is local. Serbian prayer and words of comfort offered. Advised of continuous availability. Nothing further at this time.     Clemente Dalton, [facility], Sabianism Provider

## 2017-01-18 NOTE — PROGRESS NOTES
Speech pathology  Orders received, chart reviewed and note MRI results. Spoke with RN who recommended I hold off on eval until this afternoon as patient very drowsy after not sleeping much last night. Patient passed his STAND and has reportedly been tolerating a regular diet. Will f/u this afternoon as appropriate and able.  Thanks, Igor Saleh M.S. CCC-SLP

## 2017-01-18 NOTE — PROGRESS NOTES
Was notified for a Code S  on bedside per RN, patient has more slurred speech and UE extremity weakness then l;ast night, MRI with acute CVA  Care handed over to Dr. Mitali Feng

## 2017-01-18 NOTE — PROGRESS NOTES
Primary Nurse Christina Obregon RN and chang RN performed a dual skin assessment on this patient No impairment noted  Ismael score is 20

## 2017-01-18 NOTE — PROCEDURES
[address]  *** FINAL REPORT ***    Name: Neha Johnson  MRN: [id number]    Inpatient  : 1925  HIS Order #: [id number]  [address] [facility] Visit #: [id number]  Date: 2017    TYPE OF TEST: Cerebrovascular Duplex    REASON FOR TEST  facial droop R sided weakness    Right Carotid:-             Proximal               Mid                 Distal  cm/s  Systolic  Diastolic  Systolic  Diastolic  Systolic  Diastolic  CCA:     18.9       6.0                            66.0      12.0  Bulb:  ECA:    223.0  ICA:     63.0       8.0                            71.0      14.0  ICA/CCA:  1.0       0.7    ICA Stenosis:    Right Vertebral:-  Finding: Antegrade  Sys:       40.0  Julita:    Right Subclavian:    Left Carotid:-            Proximal                Mid                 Distal  cm/s  Systolic  Diastolic  Systolic  Diastolic  Systolic  Diastolic  CCA:     67.9      12.0                            63.0       9.0  Bulb:  ECA:    106.0  ICA:     56.0      10.0                            50.0      12.0  ICA/CCA:  0.9       1.1    ICA Stenosis:    Left Vertebral:-  Finding: Antegrade  Sys:       43.0  Julita:    Left Subclavian:    INTERPRETATION/FINDINGS  PROCEDURE:  Color duplex ultrasound imaging of extracranial  cerebrovascular arteries. FINDINGS:       Right:  Internal carotid velocity is within normal limits. There   is narrowing of the flow channel on color Doppler imaging and mixed  density plaque on B-mode imaging, consistent with less than 50 percent   stenosis. The common carotid artery is patent and without evidence  of hemodynamically significant stenosis. The external carotid  contains elevated peak systolic velocities. Left:  Internal carotid velocity is within normal limits. There  is narrowing of the flow channel on color Doppler imaging and mixed  density plaque on B-mode imaging, consistent with less than 50 percent   stenosis.   The common and external carotid arteries are patent and  without evidence of hemodynamically significant stenosis. IMPRESSION:  Consistent with less than 50% stenosis of the right  internal carotid and less than 50% stenosis of the left internal  carotid. Vertebrals are patent with antegrade flow. ADDITIONAL COMMENTS    I have personally reviewed the data relevant to the interpretation of  this  study.     TECHNOLOGIST: Pricila Johnson RVT  Signed: 01/18/2017 06:15 PM    PHYSICIAN: Ermelinda Jorgensen MD  Signed: 01/19/2017 02:13 PM

## 2017-01-18 NOTE — ED NOTES
TRANSFER - OUT REPORT:    Verbal report given to Delroy Rabago RN (name) on Enedelia  being transferred to [facility] (unit) for routine progression of care       Report consisted of patients Situation, Background, Assessment and   Recommendations(SBAR). Information from the following report(s) SBAR, Kardex, ED Summary, Intake/Output, MAR, Recent Results and Cardiac Rhythm NSR w/ 1st PVC was reviewed with the receiving nurse. Lines:   Peripheral IV 01/17/17 Left Antecubital (Active)   Site Assessment Clean, dry, & intact 1/17/2017  5:05 PM   Phlebitis Assessment 0 1/17/2017  5:05 PM   Infiltration Assessment 0 1/17/2017  5:05 PM   Dressing Status Clean, dry, & intact 1/17/2017  5:05 PM   Dressing Type Transparent 1/17/2017  5:05 PM   Hub Color/Line Status Green 1/17/2017  5:05 PM        Opportunity for questions and clarification was provided.       Patient transported with:   Monitor

## 2017-01-19 PROCEDURE — 65660000000 HC RM CCU STEPDOWN

## 2017-01-19 PROCEDURE — 74011000258 HC RX REV CODE- 258: Performed by: FAMILY MEDICINE

## 2017-01-19 PROCEDURE — G8978 MOBILITY CURRENT STATUS: HCPCS

## 2017-01-19 PROCEDURE — 74011250636 HC RX REV CODE- 250/636: Performed by: FAMILY MEDICINE

## 2017-01-19 PROCEDURE — 74011250637 HC RX REV CODE- 250/637: Performed by: FAMILY MEDICINE

## 2017-01-19 PROCEDURE — G8979 MOBILITY GOAL STATUS: HCPCS

## 2017-01-19 PROCEDURE — 97161 PT EVAL LOW COMPLEX 20 MIN: CPT

## 2017-01-19 PROCEDURE — 74011250637 HC RX REV CODE- 250/637: Performed by: PSYCHIATRY & NEUROLOGY

## 2017-01-19 RX ADMIN — Medication 10 ML: at 21:43

## 2017-01-19 RX ADMIN — VITAMIN D, TAB 1000IU (100/BT) 2000 UNITS: 25 TAB at 09:12

## 2017-01-19 RX ADMIN — HEPARIN SODIUM 5000 UNITS: 5000 INJECTION, SOLUTION INTRAVENOUS; SUBCUTANEOUS at 06:14

## 2017-01-19 RX ADMIN — ATORVASTATIN CALCIUM 20 MG: 20 TABLET, FILM COATED ORAL at 09:13

## 2017-01-19 RX ADMIN — ASPIRIN 325 MG: 325 TABLET ORAL at 09:12

## 2017-01-19 RX ADMIN — FAMOTIDINE 20 MG: 20 TABLET ORAL at 21:43

## 2017-01-19 RX ADMIN — ACETAMINOPHEN 1000 MG: 500 TABLET, FILM COATED ORAL at 09:13

## 2017-01-19 RX ADMIN — FAMOTIDINE 20 MG: 20 TABLET ORAL at 09:13

## 2017-01-19 RX ADMIN — ACETAMINOPHEN 1000 MG: 500 TABLET, FILM COATED ORAL at 18:24

## 2017-01-19 RX ADMIN — Medication 10 ML: at 06:15

## 2017-01-19 RX ADMIN — ESCITALOPRAM OXALATE 10 MG: 10 TABLET ORAL at 09:12

## 2017-01-19 RX ADMIN — HEPARIN SODIUM 5000 UNITS: 5000 INJECTION, SOLUTION INTRAVENOUS; SUBCUTANEOUS at 13:21

## 2017-01-19 RX ADMIN — HEPARIN SODIUM 5000 UNITS: 5000 INJECTION, SOLUTION INTRAVENOUS; SUBCUTANEOUS at 21:43

## 2017-01-19 RX ADMIN — TAMSULOSIN HYDROCHLORIDE 0.4 MG: 0.4 CAPSULE ORAL at 09:12

## 2017-01-19 RX ADMIN — QUETIAPINE FUMARATE 25 MG: 25 TABLET, FILM COATED ORAL at 18:24

## 2017-01-19 RX ADMIN — QUETIAPINE FUMARATE 25 MG: 25 TABLET, FILM COATED ORAL at 09:13

## 2017-01-19 RX ADMIN — DEXTROSE MONOHYDRATE AND SODIUM CHLORIDE 75 ML/HR: 5; .9 INJECTION, SOLUTION INTRAVENOUS at 18:35

## 2017-01-19 RX ADMIN — Medication 10 ML: at 13:22

## 2017-01-19 RX ADMIN — DONEPEZIL HYDROCHLORIDE 10 MG: 10 TABLET, FILM COATED ORAL at 21:43

## 2017-01-19 RX ADMIN — AMLODIPINE BESYLATE 10 MG: 5 TABLET ORAL at 09:12

## 2017-01-19 NOTE — PROGRESS NOTES
Care Management Interventions  PCP Verified by CM: Yes  Mode of Transport at Discharge: BLS  Transition of Care Consult (CM Consult): Discharge Planning  Physical Therapy Consult: Yes  Occupational Therapy Consult: Yes  Speech Therapy Consult: Yes  Current Support Network: Assisted Living  Confirm Follow Up Transport: Family  Plan discussed with Pt/Family/Caregiver: Yes  Freedom of Choice Offered: Yes  Discharge Location  Discharge Placement: Skilled nursing facility    Chart reviewed for transitions of care, discussed patient during rounds. Patient was admitted from [facility], which is the assisted living memory care unit of [facility]. Patient has advanced dementia and was admitted with acute multi foci left cerebral infarction involving the frontal, parietal and occipital lobes as well as chronic SDH. Patient has been evaluated by the therapies and they are recommending SNF rehab. Cm met with patients' daughter Yonathan Elena, [phone]) in the room to explain role and offer support. We discussed SNF locations and she would like him to go to the rehab at [facility]. Cm informed her that patient will need insurance authorization. Referral sent to them via ecin; will follow.   [employer] , [place]

## 2017-01-19 NOTE — PROGRESS NOTES
Patient returned from MRI. The patient's daughter indicated the patient's speech was significantly more slurred than at the time of his admission. Drift noted in both arms coupled with reduced command following. RN sought assistance from second RN. Code S called in order to obtain additional assistance to further evaluate the patient. MD, ICU RN, pastoral care, Charge RN, CCL, and respiratory responded. MRI confirmed patient experienced a stroke. Outside of the window for tPA. No further action at this time. Continue to monitor.

## 2017-01-19 NOTE — PROGRESS NOTES
[facility] [employer] have accepted patient and are working on insurance authorization.   [employer] , [place]

## 2017-01-19 NOTE — INTERDISCIPLINARY ROUNDS
IDR/SLIDR Summary          Patient: Taisha Scanlon MRN: [id number]    Age: 80 y.o. YOB: 1925 Room/Bed: [facility]   Admit Diagnosis: TIA (transient ischemic attack)  TIA (transient ischemic attack)  Acute ischemic stroke ([facility]  Acute CVA (cerebrovascular accident) ([facility]  Principal Diagnosis: <principal problem not specified>   Goals: Safety  Readmission: NO  Quality Measure: Not applicable  VTE Prophylaxis: Chemical  Influenza Vaccine screening completed? YES  Pneumococcal Vaccine screening completed? NO  Mobility needs: Yes   Nutrition plan:Yes  Consults:P.T, O.T. and Case Management    Financial concerns:No  Escalated to CM? YES  RRAT Score: 26   Interventions:  Testing due for pt today?  NO  LOS: 2 days Expected length of stay 3-4 days  Discharge plan: likely back to [facility]   PCP: Shahrzad Nguyen MD  Transportation needs: Yes    Days before discharge:one day until discharge   Discharge disposition: SNF    Signed:     Karey Garsia RN  1/19/2017  1:27 AM

## 2017-01-19 NOTE — PROGRESS NOTES
Problem: Mobility Impaired (Adult and Pediatric)  Goal: *Acute Goals and Plan of Care (Insert Text)  Physical Therapy Goals  Initiated 1/19/2017  1. Patient will move from supine to sit and sit to supine , scoot up and down and roll side to side in bed with modified independence within 7 day(s). 2. Patient will transfer from bed to chair and chair to bed with supervision/set-up using the least restrictive device within 7 day(s). 3. Patient will perform sit to stand with supervision/set-up within 7 day(s). 4. Patient will ambulate with supervision/set-up for 150 feet with the least restrictive device within 7 day(s). 6. Patient will improve Cruz Balance score by 7 points within 7 days. PHYSICAL THERAPY EVALUATION- NEURO POPULATION     Patient: Alana Webb (45 y.o. male)  Date: 1/19/2017  Primary Diagnosis: TIA (transient ischemic attack)  TIA (transient ischemic attack)  Acute ischemic stroke ([facility]  Acute CVA (cerebrovascular accident) ([facility]        Precautions:   DNR, Fall      ASSESSMENT :  Based on the objective data described below, the patient presents with equal LE strength, impaired balance requiring B UE support to RW during gait, and overall decreased independence from baseline following admission for TIA and subsequent CVA following code S since admission. PTA patient was living at [address] [place] [address], ambulating with a RW and supervision. Currently MRI + scattered acute cerebral infarctions. There is a moderate sized infarction in the posterior left frontal lobe with additional scattered foci of infarction in the left parietal lobe and left occipital lobe. Overall min A for gait especially for turning, requires RW for balance and safety. Patient scored a 9/56 on CRUZ this date indicating high fall risk. Patient agreeable to remain up in chair at end of session. Recommend SNF at discharge to return patient to supervision level for gait.      Patient will benefit from skilled intervention to address the above impairments. Patients rehabilitation potential is considered to be Good  Factors which may influence rehabilitation potential include:   [ ]           None noted  [ ]           Mental ability/status  [X]           Medical condition  [ ]           Home/family situation and support systems  [ ]           Safety awareness  [ ]           Pain tolerance/management  [ ]           Other:        PLAN :  Recommendations and Planned Interventions:  [X]             Bed Mobility Training             [X]      Neuromuscular Re-Education  [X]             Transfer Training                   [ ]      Orthotic/Prosthetic Training  [X]             Gait Training                         [ ]      Modalities  [X]             Therapeutic Exercises           [ ]      Edema Management/Control  [X]             Therapeutic Activities            [X]      Patient and Family Training/Education  [ ]             Other (comment):  Frequency/Duration: Patient will be followed by physical therapy 4 times a week to address goals. Discharge Recommendations: Skilled Nursing Facility  Further Equipment Recommendations for Discharge: TBD       SUBJECTIVE:   Patient stated Bernadine Blotter let's go.       OBJECTIVE DATA SUMMARY:   HISTORY:    Past Medical History   Diagnosis Date    Hypertension      Memory impairment     History reviewed. No pertinent past surgical history.   Prior Level of Function/Home Situation: supervision with RW, memory care unit  Personal factors and/or comorbidities impacting plan of care:      Home Situation  Home Environment: [address] [place] [address] Name: barrett hernandez  One/Two Story Residence: One story  Living Alone: No  Support Systems: Skilled nursing facility  Patient Expects to be Discharged to[de-identified] Skilled nursing facility  Current DME Used/Available at Home: None     EXAMINATION/PRESENTATION/DECISION MAKING:   Critical Behavior:  Neurologic State: Drowsy  Orientation Level: Oriented to person, Oriented to place  Cognition: Follows commands  Safety/Judgement: Awareness of environment  Skin:    Strength:    Strength: Within functional limits (5/5 B LE's throughout)                    Coordination: Tone & Sensation:                                  Range Of Motion:  AROM: Within functional limits                       Functional Mobility:  Bed Mobility:     Supine to Sit: Contact guard assistance        Transfers:  Sit to Stand: Minimum assistance  Stand to Sit: Minimum assistance  Stand Pivot Transfers: Minimum assistance                    Balance:   Sitting: Intact  Standing: Impaired  Standing - Static: Fair  Standing - Dynamic : Fair  Ambulation/Gait Training:  Distance (ft): 60 Feet (ft)  Assistive Device: Gait belt;Walker, rolling  Ambulation - Level of Assistance: Minimal assistance                 Base of Support: Narrowed     Speed/Nyasia: Fluctuations  Step Length: Right shortened;Left shortened                               Functional Measure  Szymanski Balance Test:      Sitting to Standin  Standing Unsupported: 0  Sitting with Back Unsupported: 4  Standing to Sitting: 3  Transfers: 1  Standing Unsupported with Eyes Closed: 0  Standing Unsupported with Feet Together: 0  Reach Forward with Outstretched Arm: 0   Object: 0  Turn to Look Over Shoulders: 0  Turn 360 Degrees: 0  Alternate Foot on Step/Stool: 0  Standing Unsupported One Foot in Front: 0  Stand on One Le  Total: 9             56=Maximum possible score;   0-20=High fall risk  21-40=Moderate fall risk   41-56=Low fall risk      Szymanski Balance Test and G-code impairment scale:  Percentage of Impairment CH     0%    CI     1-19% CJ     20-39% CK     40-59% CL     60-79% CM     80-99% CN      100%   Szymanski   Score 0-56 56 45-55 34-44 23-33 12-22 1-11 0         G codes:   In compliance with CMSs Claims Based Outcome Reporting, the following G-code set was chosen for this patient based on their primary functional limitation being treated: The outcome measure chosen to determine the severity of the functional limitation was the CRUZ with a score of 9/56 which was correlated with the impairment scale. · Mobility - Walking and Moving Around:               - CURRENT STATUS:    CM - 80%-99% impaired, limited or restricted               - GOAL STATUS:           CL - 60%-79% impaired, limited or restricted               - D/C STATUS:                       ---------------To be determined---------------       Physical Therapy Evaluation Charge Determination   History Examination Presentation Decision-Making   LOW Complexity : Zero comorbidities / personal factors that will impact the outcome / POC LOW Complexity : 1-2 Standardized tests and measures addressing body structure, function, activity limitation and / or participation in recreation  LOW Complexity : Stable, uncomplicated  Other outcome measures CRUZ  HIGH       Based on the above components, the patient evaluation is determined to be of the following complexity level: LOW      Therapeutic Exercises:      Pain:  Pain Scale 1: Numeric (0 - 10)  Pain Intensity 1: 0              Activity Tolerance:   Good  Please refer to the flowsheet for vital signs taken during this treatment. After treatment:   [X]     Patient left in no apparent distress sitting up in chair  [ ]     Patient left in no apparent distress in bed  [X]     Call bell left within reach  [X]     Nursing notified  [X]     Caregiver present  [X]     Chair alarm activated      COMMUNICATION/EDUCATION:   The patients plan of care was discussed with: Registered Nurse. Family  was educated regarding His deficit(s) of decreased balance as this relates to His diagnosis of CVA. She demonstrated Good understanding as evidenced by agreement to deficits and wants SNF at discharge. [X]  Fall prevention education was provided and the patient/caregiver indicated understanding.   [X]  Patient/family have participated as able in goal setting and plan of care. [X]  Patient/family agree to work toward stated goals and plan of care. [ ]  Patient understands intent and goals of therapy, but is neutral about his/her participation. [ ]  Patient is unable to participate in goal setting and plan of care.      Thank you for this referral.  Chirag Avila, PT   Time Calculation: 14 mins

## 2017-01-19 NOTE — PROGRESS NOTES
Bedside and Verbal shift change report given to Mayi Silverio (oncoming nurse) by Vijaya Isaac (offgoing nurse). Report included the following information SBAR, Kardex, Intake/Output, MAR and Recent Results.

## 2017-01-19 NOTE — PROGRESS NOTES
Hospitalist Progress Note  Rios [place] MD  Office: [phone]  Cell: [phone]      Date of Service:  2017  NAME:  Jamal Gibson  :  1925  MRN:  [id number]      Admission Summary:   Jamal Gibson is a 80 y.o. male with advanced dementia with behavioral disorder was brought to the ER on  for weakness,right facial droop noticed a day prior to presentation. He resides at [facility] in the memory care unit. He is DNR. Interval history / Subjective:    I saw him carlos code S earlier. See the brief A&P proir to this note. Assessment & Plan:      Acute multi foci left cerebral infarction involving the frontal,priental and occipital lobes. Chronic SDH  -Continue asa,lipitor  -Echocardiogram with normal EF,no LV thrombus,noapparent shunt  -CUS <50% stenosis bilateral ICA!  -Daughter would not want any aggressive measures such as CEA if it was indicated. Focus on medical mx. So no MRA/CTA brain  -Tele neurologist consulted. Patient was not candidate for tPA. -Complete work up  -Possible discharge skilled level at [facility]  with behavioral disorder  -Continue with aricept,lexapro,seroquel    HTN: on norvasc  CKD stage III    Code status: DNR  DVT prophylaxis: heparin    Care Plan discussed with: Daughter,Dr Alonsoelvira Gupta from [address] [place] [address]  Disposition: Caterina Baez? SNF     Hospital Problems  Date Reviewed: 2016          Codes Class Noted POA    Acute ischemic stroke [facility]) ICD-10-CM: I63.9  ICD-9-CM: 434.91  2017 Unknown        Acute CVA (cerebrovascular accident) [facility]) ICD-10-CM: I63.9  ICD-9-CM: 434.91  2017 Unknown        Stroke (cerebrum) ([facility] ICD-10-CM: I63.9  ICD-9-CM: 434.91  2017 Unknown                Review of Systems:   Review of systems not obtained due to patient factors. Vital Signs:    Last 24hrs VS reviewed since prior progress note.  Most recent are:  Visit Vitals    BP 142/58    Pulse 60    Temp 97.9 °F (36.6 °C)    Resp 18    Ht 5' 9\" (1.753 m)    Wt 62.8 kg (138 lb 6.4 oz)    SpO2 99%    BMI 20.44 kg/m2         Intake/Output Summary (Last 24 hours) at 01/18/17 2148  Last data filed at 01/18/17 1200   Gross per 24 hour   Intake              240 ml   Output                0 ml   Net              240 ml        Physical Examination:             Constitutional:  Awake but confused   ENT:  Oral mucous moist, oropharynx benign. Neck supple,    Resp:  CTA bilaterally. No wheezing/rhonchi/rales. No accessory muscle use   CV:  Regular rhythm, normal rate, no murmurs, gallops, rubs    GI:  Soft, non distended, non tender. normoactive bowel sounds, no hepatosplenomegaly     Musculoskeletal:  No edema, warm, 2+ pulses throughout    Neurologic: Appears to move all extremities,but exam limited as patient is not too cooperative. Data Review:    Review and/or order of clinical lab test  Review and/or order of tests in the radiology section of CPT  Review and/or order of tests in the medicine section of CPT      Labs:     Recent Labs      01/18/17   0408  01/17/17   1711   WBC  5.2  5.9   HGB  11.1*  11.1*   HCT  34.5*  33.9*   PLT  164  167     Recent Labs      01/18/17   0408  01/17/17   1711   NA  141  142   K  4.1  4.7   CL  109*  107   CO2  21  24   BUN  41*  48*   CREA  1.63*  2.03*   GLU  94  105*   CA  8.6  8.7     Recent Labs      01/17/17   1711   SGOT  16   ALT  22   AP  80   TBILI  0.7   TP  7.5   ALB  3.8   GLOB  3.7     Recent Labs      01/17/17   1711   INR  1.0   PTP  10.4   APTT  27.5      No results for input(s): FE, TIBC, PSAT, FERR in the last 72 hours. No results found for: FOL, RBCF   No results for input(s): PH, PCO2, PO2 in the last 72 hours.   Recent Labs      01/17/17   1711   CPK  304   CKNDX  2.0   TROIQ  <0.04     Lab Results   Component Value Date/Time    Cholesterol, total 147 01/18/2017 04:08 AM    HDL Cholesterol 57 01/18/2017 04:08 AM    LDL, calculated 74.6 01/18/2017 04:08 AM    Triglyceride 77 01/18/2017 04:08 AM    CHOL/HDL Ratio 2.6 01/18/2017 04:08 AM     Lab Results   Component Value Date/Time    Glucose (POC) 90 01/18/2017 09:23 AM     Lab Results   Component Value Date/Time    Color YELLOW/STRAW 11/06/2016 08:06 PM    Appearance CLEAR 11/06/2016 08:06 PM    Specific gravity 1.020 11/06/2016 08:06 PM    pH (UA) 5.5 11/06/2016 08:06 PM    Protein NEGATIVE  11/06/2016 08:06 PM    Glucose NEGATIVE  11/06/2016 08:06 PM    Ketone NEGATIVE  11/06/2016 08:06 PM    Bilirubin NEGATIVE  11/06/2016 08:06 PM    Urobilinogen 0.2 11/06/2016 08:06 PM    Nitrites NEGATIVE  11/06/2016 08:06 PM    Leukocyte Esterase NEGATIVE  11/06/2016 08:06 PM    Epithelial cells FEW 11/06/2016 08:06 PM    Bacteria NEGATIVE  11/06/2016 08:06 PM    WBC 0-4 11/06/2016 08:06 PM    RBC 0-5 11/06/2016 08:06 PM         Medications Reviewed:     Current Facility-Administered Medications   Medication Dose Route Frequency    atorvastatin (LIPITOR) tablet 20 mg  20 mg Oral DAILY    sodium chloride (NS) flush 5-10 mL  5-10 mL IntraVENous Q8H    sodium chloride (NS) flush 5-10 mL  5-10 mL IntraVENous PRN    acetaminophen (TYLENOL) tablet 650 mg  650 mg Oral Q4H PRN    Or    acetaminophen (TYLENOL) solution 650 mg  650 mg Per NG tube Q4H PRN    Or    acetaminophen (TYLENOL) suppository 650 mg  650 mg Rectal Q4H PRN    aspirin (ASPIRIN) tablet 325 mg  325 mg Oral DAILY    famotidine (PEPCID) tablet 20 mg  20 mg Oral Q12H    heparin (porcine) injection 5,000 Units  5,000 Units SubCUTAneous Q8H    dextrose 5% and 0.9% NaCl infusion  75 mL/hr IntraVENous CONTINUOUS    acetaminophen (TYLENOL) tablet 1,000 mg  1,000 mg Oral BID    amLODIPine (NORVASC) tablet 10 mg  10 mg Oral DAILY    cholecalciferol (VITAMIN D3) tablet 2,000 Units  2,000 Units Oral DAILY    donepezil (ARICEPT) tablet 10 mg  10 mg Oral QHS    escitalopram oxalate (LEXAPRO) tablet 10 mg  10 mg Oral DAILY    QUEtiapine (SEROquel) tablet 25 mg  25 mg Oral BID    tamsulosin (FLOMAX) capsule 0.4 mg  0.4 mg Oral DAILY     ______________________________________________________________________  EXPECTED LENGTH OF STAY: - - -  ACTUAL LENGTH OF STAY:          1                 Reyes Blunt, MD

## 2017-01-19 NOTE — PROGRESS NOTES
Hospitalist Progress Note  Paul Arboleda NP  Office: [phone]  Cell: [phone]      Date of Service:  2017  NAME:  Edu García  :  1925  MRN:  [id number]      Admission Summary:   Edu García is a 80 y.o. male with advanced dementia with behavioral disorder was brought to the ER on  for weakness,right facial droop noticed a day prior to presentation. He resides at [place] [address] in the memory care unit. He is DNR. Interval history / Subjective:     Patient improved today per daughters report. Baseline dementia . Able to work well with PT today. Assessment & Plan:      Acute multi foci left cerebral infarction involving the frontal,priental and occipital lobes. Chronic SDH  -Continue asa,lipitor  -Echocardiogram with normal EF,no LV thrombus,noapparent shunt  -CUS <50% stenosis bilateral ICA!  -Daughter would not want any aggressive measures such as CEA if it was indicated. Focus on medical mx. So no MRA/CTA brain  -Tele neurologist consulted. Patient was not candidate for tPA. -Complete work up  -discharge to skilled nursing     Dementia with behavioral disorder  -Continue with aricept,lexapro,seroquel    HTN: on norvasc  CKD stage III    Code status: DNR  DVT prophylaxis: heparin    Care Plan discussed with: Dr. Erica Morgan,  Chelle, and patients daughter   Disposition: Plan is to discharge to [facility] [employer] SNF once insurance authorization is obtained. Hospital Problems  Date Reviewed: 2016          Codes Class Noted POA    Acute ischemic stroke [facility]) ICD-10-CM: I63.9  ICD-9-CM: 434.91  2017 Unknown        Acute CVA (cerebrovascular accident) [facility]) ICD-10-CM: I63.9  ICD-9-CM: 434.91  2017 Unknown        Stroke (cerebrum) ([facility] ICD-10-CM: I63.9  ICD-9-CM: 434.91  2017 Unknown                Review of Systems:   Review of systems not obtained due to patient factors.        Vital Signs:    Last 24hrs VS reviewed since prior progress note. Most recent are:  Visit Vitals    /47 (BP 1 Location: Left arm, BP Patient Position: At rest)    Pulse 61    Temp 97.8 °F (36.6 °C)    Resp 15    Ht 5' 9\" (1.753 m)    Wt 62.6 kg (138 lb)    SpO2 98%    BMI 20.38 kg/m2       No intake or output data in the 24 hours ending 01/19/17 1847     Physical Examination:             Constitutional:  Awake but confused   ENT:  Oral mucous moist, oropharynx benign. Neck supple,    Resp:  CTA bilaterally. No wheezing/rhonchi/rales. No accessory muscle use   CV:  Regular rhythm, normal rate, no murmurs, gallops, rubs    GI:  Soft, non distended, non tender. normoactive bowel sounds, no hepatosplenomegaly     Musculoskeletal:  No edema, warm, 2+ pulses throughout    Neurologic: Appears to move all extremities,but exam limited as patient is not too cooperative. Data Review:    Review and/or order of clinical lab test  Review and/or order of tests in the radiology section of CPT  Review and/or order of tests in the medicine section of CPT      Labs:     Recent Labs      01/18/17   0408  01/17/17   1711   WBC  5.2  5.9   HGB  11.1*  11.1*   HCT  34.5*  33.9*   PLT  164  167     Recent Labs      01/18/17   0408  01/17/17   1711   NA  141  142   K  4.1  4.7   CL  109*  107   CO2  21  24   BUN  41*  48*   CREA  1.63*  2.03*   GLU  94  105*   CA  8.6  8.7     Recent Labs      01/17/17   1711   SGOT  16   ALT  22   AP  80   TBILI  0.7   TP  7.5   ALB  3.8   GLOB  3.7     Recent Labs      01/17/17   1711   INR  1.0   PTP  10.4   APTT  27.5      No results for input(s): FE, TIBC, PSAT, FERR in the last 72 hours. No results found for: FOL, RBCF   No results for input(s): PH, PCO2, PO2 in the last 72 hours.   Recent Labs      01/17/17 1711   CPK  304   CKNDX  2.0   TROIQ  <0.04     Lab Results   Component Value Date/Time    Cholesterol, total 147 01/18/2017 04:08 AM    HDL Cholesterol 57 01/18/2017 04:08 AM    LDL, calculated 74.6 01/18/2017 04:08 AM    Triglyceride 77 01/18/2017 04:08 AM    CHOL/HDL Ratio 2.6 01/18/2017 04:08 AM     Lab Results   Component Value Date/Time    Glucose (POC) 90 01/18/2017 09:23 AM     Lab Results   Component Value Date/Time    Color YELLOW/STRAW 11/06/2016 08:06 PM    Appearance CLEAR 11/06/2016 08:06 PM    Specific gravity 1.020 11/06/2016 08:06 PM    pH (UA) 5.5 11/06/2016 08:06 PM    Protein NEGATIVE  11/06/2016 08:06 PM    Glucose NEGATIVE  11/06/2016 08:06 PM    Ketone NEGATIVE  11/06/2016 08:06 PM    Bilirubin NEGATIVE  11/06/2016 08:06 PM    Urobilinogen 0.2 11/06/2016 08:06 PM    Nitrites NEGATIVE  11/06/2016 08:06 PM    Leukocyte Esterase NEGATIVE  11/06/2016 08:06 PM    Epithelial cells FEW 11/06/2016 08:06 PM    Bacteria NEGATIVE  11/06/2016 08:06 PM    WBC 0-4 11/06/2016 08:06 PM    RBC 0-5 11/06/2016 08:06 PM         Medications Reviewed:     Current Facility-Administered Medications   Medication Dose Route Frequency    atorvastatin (LIPITOR) tablet 20 mg  20 mg Oral DAILY    sodium chloride (NS) flush 5-10 mL  5-10 mL IntraVENous Q8H    sodium chloride (NS) flush 5-10 mL  5-10 mL IntraVENous PRN    acetaminophen (TYLENOL) tablet 650 mg  650 mg Oral Q4H PRN    Or    acetaminophen (TYLENOL) solution 650 mg  650 mg Per NG tube Q4H PRN    Or    acetaminophen (TYLENOL) suppository 650 mg  650 mg Rectal Q4H PRN    aspirin (ASPIRIN) tablet 325 mg  325 mg Oral DAILY    famotidine (PEPCID) tablet 20 mg  20 mg Oral Q12H    heparin (porcine) injection 5,000 Units  5,000 Units SubCUTAneous Q8H    dextrose 5% and 0.9% NaCl infusion  75 mL/hr IntraVENous CONTINUOUS    acetaminophen (TYLENOL) tablet 1,000 mg  1,000 mg Oral BID    amLODIPine (NORVASC) tablet 10 mg  10 mg Oral DAILY    cholecalciferol (VITAMIN D3) tablet 2,000 Units  2,000 Units Oral DAILY    donepezil (ARICEPT) tablet 10 mg  10 mg Oral QHS    escitalopram oxalate (LEXAPRO) tablet 10 mg  10 mg Oral DAILY  QUEtiapine (SEROquel) tablet 25 mg  25 mg Oral BID    tamsulosin (FLOMAX) capsule 0.4 mg  0.4 mg Oral DAILY     ______________________________________________________________________  EXPECTED LENGTH OF STAY: 3d 19h  ACTUAL LENGTH OF STAY:          2                 Kelly Corrales, NP

## 2017-01-19 NOTE — PROGRESS NOTES
Cm contacted [facility] to follow up on the referral. Admissions informed me that they do not accept patients' insurance as they are out of network. Cm followed up with daughter in the room for additional choices: 1) [facility], 2) [facility] 3) [place]. Referrals sent to the first two, waiting for response.   [employer] , [place]

## 2017-01-19 NOTE — PROGRESS NOTES
Problem: TIA: Day 2  Goal: Nutrition/Diet  Outcome: Progressing Towards Goal  Patient able to swallow food without observable difficulty; however, needs assistance managing utensils. Contacted OT for further evaluation and interventions.

## 2017-01-20 VITALS
WEIGHT: 138.67 LBS | HEART RATE: 59 BPM | RESPIRATION RATE: 22 BRPM | TEMPERATURE: 98.2 F | BODY MASS INDEX: 20.54 KG/M2 | DIASTOLIC BLOOD PRESSURE: 56 MMHG | OXYGEN SATURATION: 98 % | SYSTOLIC BLOOD PRESSURE: 153 MMHG | HEIGHT: 69 IN

## 2017-01-20 PROCEDURE — 74011000258 HC RX REV CODE- 258: Performed by: FAMILY MEDICINE

## 2017-01-20 PROCEDURE — 74011250637 HC RX REV CODE- 250/637: Performed by: PSYCHIATRY & NEUROLOGY

## 2017-01-20 PROCEDURE — 74011250637 HC RX REV CODE- 250/637: Performed by: FAMILY MEDICINE

## 2017-01-20 PROCEDURE — 74011250636 HC RX REV CODE- 250/636: Performed by: FAMILY MEDICINE

## 2017-01-20 PROCEDURE — 97116 GAIT TRAINING THERAPY: CPT

## 2017-01-20 RX ORDER — ATORVASTATIN CALCIUM 20 MG/1
20 TABLET, FILM COATED ORAL DAILY
Qty: 30 TAB | Refills: 0 | Status: SHIPPED
Start: 2017-01-20

## 2017-01-20 RX ORDER — ASPIRIN 325 MG
325 TABLET ORAL DAILY
Qty: 30 TAB | Refills: 0 | Status: SHIPPED
Start: 2017-01-20

## 2017-01-20 RX ADMIN — HEPARIN SODIUM 5000 UNITS: 5000 INJECTION, SOLUTION INTRAVENOUS; SUBCUTANEOUS at 05:16

## 2017-01-20 RX ADMIN — QUETIAPINE FUMARATE 25 MG: 25 TABLET, FILM COATED ORAL at 17:00

## 2017-01-20 RX ADMIN — ATORVASTATIN CALCIUM 20 MG: 20 TABLET, FILM COATED ORAL at 09:23

## 2017-01-20 RX ADMIN — Medication 10 ML: at 05:16

## 2017-01-20 RX ADMIN — ACETAMINOPHEN 1000 MG: 500 TABLET, FILM COATED ORAL at 09:23

## 2017-01-20 RX ADMIN — Medication 10 ML: at 13:11

## 2017-01-20 RX ADMIN — HEPARIN SODIUM 5000 UNITS: 5000 INJECTION, SOLUTION INTRAVENOUS; SUBCUTANEOUS at 13:13

## 2017-01-20 RX ADMIN — DEXTROSE MONOHYDRATE AND SODIUM CHLORIDE 75 ML/HR: 5; .9 INJECTION, SOLUTION INTRAVENOUS at 09:22

## 2017-01-20 RX ADMIN — AMLODIPINE BESYLATE 10 MG: 5 TABLET ORAL at 09:23

## 2017-01-20 RX ADMIN — ACETAMINOPHEN 1000 MG: 500 TABLET, FILM COATED ORAL at 17:00

## 2017-01-20 RX ADMIN — QUETIAPINE FUMARATE 25 MG: 25 TABLET, FILM COATED ORAL at 09:23

## 2017-01-20 RX ADMIN — ESCITALOPRAM OXALATE 10 MG: 10 TABLET ORAL at 09:23

## 2017-01-20 RX ADMIN — TAMSULOSIN HYDROCHLORIDE 0.4 MG: 0.4 CAPSULE ORAL at 09:23

## 2017-01-20 RX ADMIN — FAMOTIDINE 20 MG: 20 TABLET ORAL at 09:23

## 2017-01-20 RX ADMIN — VITAMIN D, TAB 1000IU (100/BT) 2000 UNITS: 25 TAB at 09:23

## 2017-01-20 RX ADMIN — ASPIRIN 325 MG: 325 TABLET ORAL at 09:23

## 2017-01-20 NOTE — PROGRESS NOTES
I have reviewed discharge instructions with the patient and daughter. The patient and daughter verbalized understanding. TRANSFER - OUT REPORT:    Verbal report given to Roselind Bamberger (name) on Enedelia  being transferred to [facility] for routine progression of care       Report consisted of patients Situation, Background, Assessment and   Recommendations(SBAR). Information from the following report(s) SBAR, Kardex, Intake/Output, MAR, Recent Results and Cardiac Rhythm NSR was reviewed with the receiving nurse. Opportunity for questions and clarification was provided. Patient transported via stretcher with EMS.

## 2017-01-20 NOTE — PROGRESS NOTES
Bedside and Verbal shift change report given to [address] [place] [address] Street (oncoming nurse) by Andreas Childs RN (offgoing nurse). Report included the following information SBAR, Kardex, Intake/Output, MAR and Accordion.

## 2017-01-20 NOTE — INTERDISCIPLINARY ROUNDS
IDR/SLIDR Summary          Patient: Taisha Scanlon MRN: [id number]    Age: 80 y.o. YOB: 1925 Room/Bed: [facility]   Admit Diagnosis: TIA (transient ischemic attack)  TIA (transient ischemic attack)  Acute ischemic stroke ([facility]  Acute CVA (cerebrovascular accident) ([facility]  Principal Diagnosis: <principal problem not specified>   Goals: Safety  Readmission: NO  Quality Measure: Not applicable  VTE Prophylaxis: Chemical  Influenza Vaccine screening completed? YES  Pneumococcal Vaccine screening completed? NO  Mobility needs: Yes   Nutrition plan:Yes  Consults:P.T, O.T. and Case Management    Financial concerns:No  Escalated to CM? YES  RRAT Score: 26   Interventions: H2H  Testing due for pt today?  NO  LOS: 3 days Expected length of stay 3-4 days  [facility]   PCP: Shahrzad Nguyen MD  Transportation needs: Yes    Days before discharge:one day until discharge  and ready for discharge  Discharge disposition: SNF    Signed:     Argie Dandy  1/20/2017  1:00 AM

## 2017-01-20 NOTE — PROGRESS NOTES
Updated therapy notes have been sent to [facility] and they are working on insurance authorization.   [employer] , [place]

## 2017-01-20 NOTE — DISCHARGE SUMMARY
Discharge Summary       PATIENT ID: Bella Barbosa  MRN: [id number]   YOB: 1925    DATE OF ADMISSION: 1/17/2017  4:59 PM    DATE OF DISCHARGE: 1/20/2017    PRIMARY CARE PROVIDER: Paulina Salgado MD       DISCHARGING PHYSICIAN: aMnuel Noonan NP    To contact this individual call [phone] and ask the  to page. If unavailable ask to be transferred the Adult Hospitalist Department. CONSULTATIONS: IP CONSULT TO HOSPITALIST  IP CONSULT TO NEUROLOGY    PROCEDURES/SURGERIES: * No surgery found *    ADMITTING DIAGNOSES & HOSPITAL COURSE:     Bella Barbosa is a 80 y.o. male with advanced dementia with behavioral disorder was brought to the ER on 1/17 for weakness,right facial droop noticed a day prior to presentation. He resides at [facility] in the memory care unit. He is DNR. He found to have had a large multi foci left cerebral infarction involving the frontal , parietal and occipital lobes. Added 325 mg aspirin and Lipitor to medication regimen. Patient is recovering well, working with PT and OT. He will transfer to [address] [place] [address]. 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 Chronic SDH  -Start asa,lipitor  -Echocardiogram with normal EF,no LV thrombus,noapparent shunt  -CUS <50% stenosis bilateral ICA!  -Daughter would not want any aggressive measures such as CEA if it was indicated. Focus on medical mx. So no MRA/CTA brain  -Tele neurologist consulted. Patient was not candidate for tPA.   -Complete work up  -discharge to skilled nursing      Dementia with behavioral disorder  -Continue with aricept,lexapro,seroquel     HTN: on norvasc  CKD stage III          PENDING TEST RESULTS:   At the time of discharge the following test results are still pending: none    FOLLOW UP APPOINTMENTS:    Follow-up Information     Follow up With Details Comments Contact Info    Kim Buck DO In 6 weeks for stroke follow up [facility] at [address] [facility].  [phone]             ADDITIONAL CARE RECOMMENDATIONS:     1. Please add aspirin and lipitor to daily medication regimen. 2. Follow up with PCP as needed . DIET: Resume previous diet    ACTIVITY: Activity as tolerated       DISCHARGE MEDICATIONS:    Current Discharge Medication List         START taking these medications         Dose & Instructions Dispensing Information Comments    Morning Noon Evening Bedtime     aspirin 325 mg tablet   Commonly known as: ASPIRIN       Your next dose is: Today, Tomorrow       Other: _________            Dose: 325 mg   Take 1 Tab by mouth daily. Quantity: 30 Tab   Refills: 0                          atorvastatin 20 mg tablet   Commonly known as: LIPITOR       Your next dose is: Today, Tomorrow       Other: _________            Dose: 20 mg   Take 1 Tab by mouth daily. Quantity: 30 Tab   Refills: 0                              CONTINUE these medications which have NOT CHANGED         Dose & Instructions Dispensing Information Comments    Morning Noon Evening Bedtime     amLODIPine 10 mg tablet   Commonly known as: NORVASC       Your next dose is: Today, Tomorrow       Other: _________            Dose: 10 mg   Take 10 mg by mouth daily. Indications: Hypertension    Refills: 0                          donepezil 10 mg tablet   Commonly known as: ARICEPT       Your next dose is: Today, Tomorrow       Other: _________            Dose: 10 mg   Take 10 mg by mouth nightly. Refills: 0                          FLOMAX 0.4 mg capsule   Generic drug: tamsulosin       Your next dose is: Today, Tomorrow       Other: _________            Dose: 0.4 mg   Take 0.4 mg by mouth daily.  Indications: SYMPTOMATIC BENIGN PROSTATIC HYPERPLASIA    Refills: 0                          LEXAPRO 10 mg tablet   Generic drug: escitalopram oxalate       Your next dose is: Today, Tomorrow       Other: _________            Dose: 10 mg   Take 10 mg by mouth daily. Refills: 0                          melatonin 3 mg tablet       Your next dose is: Today, Tomorrow       Other: _________            Dose: 3 mg   Take 3 mg by mouth nightly. Refills: 0                          NAMENDA XR 28 mg capsule   Generic drug: memantine ER       Your next dose is: Today, Tomorrow       Other: _________            Dose: 28 mg   Take 28 mg by mouth daily. Refills: 0                          SEROquel 25 mg tablet   Generic drug: QUEtiapine       Your next dose is: Today, Tomorrow       Other: _________            Dose: 12.5 mg   Take 12.5 mg by mouth two (2) times daily as needed. Refills: 0                          * TYLENOL EXTRA STRENGTH 500 mg tablet   Generic drug: acetaminophen       Your next dose is: Today, Tomorrow       Other: _________            Dose: 1000 mg   Take 1,000 mg by mouth two (2) times a day. Refills: 0                          * TYLENOL EXTRA STRENGTH 500 mg tablet   Generic drug: acetaminophen       Your next dose is: Today, Tomorrow       Other: _________            Dose: 500 mg   Take 500 mg by mouth every four (4) hours as needed for Pain. Refills: 0                          VITAMIN D3 2,000 unit Tab   Generic drug: cholecalciferol (vitamin D3)       Your next dose is: Today, Tomorrow       Other: _________            Dose: 1 Tab   Take 1 Tab by mouth daily. NOTIFY YOUR PHYSICIAN FOR ANY OF THE FOLLOWING:   Fever over 101 degrees for 24 hours. Chest pain, shortness of breath, fever, chills, nausea, vomiting, diarrhea, change in mentation, falling, weakness, bleeding. Severe pain or pain not relieved by medications. Or, any other signs or symptoms that you may have questions about.     DISPOSITION:    Home With:   OT  PT  HH  RN       SNF/Inpatient Rehab/LTAC   X Independent/assisted living    Hospice    Other:       PATIENT CONDITION AT DISCHARGE:     Functional status    Poor    X Deconditioned     Independent      Cognition     Lucid     Forgetful    X Dementia      Catheters/lines (plus indication)    Boucher     PICC     PEG    X None      Code status     Full code    X DNR          CHRONIC MEDICAL DIAGNOSES:  Problem List as of 1/20/2017  Date Reviewed: 1/20/2017          Codes Class Noted - Resolved    Acute ischemic stroke [facility]) ICD-10-CM: I63.9  ICD-9-CM: 434.91  1/18/2017 - Present        Acute CVA (cerebrovascular accident) [facility]) ICD-10-CM: I63.9  ICD-9-CM: 434.91  1/18/2017 - Present        Stroke (cerebrum) ([facility] ICD-10-CM: I63.9  ICD-9-CM: 434.91  1/17/2017 - Present        HTN (hypertension) (Chronic) ICD-10-CM: I10  ICD-9-CM: 401.9  11/8/2016 - Present        BPH (benign prostatic hyperplasia) (Chronic) ICD-10-CM: N40.0  ICD-9-CM: 600.00  11/8/2016 - Present        Dementia (Chronic) ICD-10-CM: F03.90  ICD-9-CM: 294.20  11/8/2016 - Present        SD[facility] (subdural hematoma) [facility] ICD-10-CM: I62.00  ICD-9-CM: 432.1  11/6/2016 - Present                CDMP Checked:    Yes X         Signed:   Alexandru Burroughs NP  1/20/2017  3:16 PM

## 2017-01-20 NOTE — PROGRESS NOTES
Call received from [employer] stating that the case is going to the Medical Director with [employer], because the physical therapy note indicated that the patient walked 300 feet, which would not constitute, in their opinion, the need for SNF placement. If denied, he will have to return to his [facility]. Kody contacted [facility]- (where patient was before) and spoke with nurse Onesimo Bobo, [phone], [phone]: [phone]. Ms. Torres Crews confirmed they could accept patient back if he was not able to go to a SNF, and cm has faxed her progress notes. Cm then contacted patients' daughter Jasvir Le, [phone]) to give an update.    [employer] , [place]

## 2017-01-20 NOTE — DISCHARGE INSTRUCTIONS
Discharge SNF/Rehab Instructions/LTAC       PATIENT ID: Maxime Jimenez  MRN: [id number]   YOB: 1925    DATE OF ADMISSION: 1/17/2017  4:59 PM    DATE OF DISCHARGE: 1/20/2017    PRIMARY CARE PROVIDER: Jimmie Aguilar MD       DISCHARGING PHYSICIAN: Brittney Rome NP    To contact this individual call [phone] and ask the  to page. If unavailable ask to be transferred the Adult Hospitalist Department. CONSULTATIONS: IP CONSULT TO HOSPITALIST  IP CONSULT TO NEUROLOGY    PROCEDURES/SURGERIES: * No surgery found *    ADMITTING DIAGNOSES & HOSPITAL COURSE:     Maxime Jimenez is a 80 y.o. male with advanced dementia with behavioral disorder was brought to the ER on 1/17 for weakness,right facial droop noticed a day prior to presentation. He resides at [facility] in the memory care unit. He is DNR. He found to have had a large multi foci left cerebral infarction involving the frontal , parietal and occipital lobes. Added 325 mg aspirin and Lipitor to medication regimen. Patient is recovering well, working with PT and OT. He will transfer to [address] [place] [address] SNF. 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 Chronic SDH  -Start asa,lipitor  -Echocardiogram with normal EF,no LV thrombus,noapparent shunt  -CUS <50% stenosis bilateral ICA!  -Daughter would not want any aggressive measures such as CEA if it was indicated. Focus on medical mx. So no MRA/CTA brain  -Tele neurologist consulted. Patient was not candidate for tPA.   -Complete work up  -discharge to skilled nursing      Dementia with behavioral disorder  -Continue with aricept,lexapro,seroquel     HTN: on norvasc  CKD stage III          PENDING TEST RESULTS:   At the time of discharge the following test results are still pending: none    FOLLOW UP APPOINTMENTS:    Follow-up Information     Follow up With Details Comments [address] Micah Adler DO In 6 weeks for stroke follow up [facility] at [address] [facility].  [phone]             ADDITIONAL CARE RECOMMENDATIONS:     1. Please add aspirin and lipitor to daily medication regimen. 2. Follow up with PCP as needed . DIET: Resume previous diet    ACTIVITY: Activity as tolerated       DISCHARGE MEDICATIONS:   See Medication Reconciliation Form      NOTIFY YOUR PHYSICIAN FOR ANY OF THE FOLLOWING:   Fever over 101 degrees for 24 hours. Chest pain, shortness of breath, fever, chills, nausea, vomiting, diarrhea, change in mentation, falling, weakness, bleeding. Severe pain or pain not relieved by medications. Or, any other signs or symptoms that you may have questions about.     DISPOSITION:    Home With:   OT  PT  HH  RN       SNF/Inpatient Rehab/LTAC   X Independent/assisted living    Hospice    Other:       PATIENT CONDITION AT DISCHARGE:     Functional status    Poor    X Deconditioned     Independent      Cognition     Lucid     Forgetful    X Dementia      Catheters/lines (plus indication)    Boucher     PICC     PEG    X None      Code status     Full code    X DNR          CHRONIC MEDICAL DIAGNOSES:  Problem List as of 1/20/2017  Date Reviewed: 1/20/2017          Codes Class Noted - Resolved    Acute ischemic stroke [facility]) ICD-10-CM: I63.9  ICD-9-CM: 434.91  1/18/2017 - Present        Acute CVA (cerebrovascular accident) [facility]) ICD-10-CM: I63.9  ICD-9-CM: 434.91  1/18/2017 - Present        Stroke (cerebrum) [facility] ICD-10-CM: I63.9  ICD-9-CM: 434.91  1/17/2017 - Present        HTN (hypertension) (Chronic) ICD-10-CM: I10  ICD-9-CM: 401.9  11/8/2016 - Present        BPH (benign prostatic hyperplasia) (Chronic) ICD-10-CM: N40.0  ICD-9-CM: 600.00  11/8/2016 - Present        Dementia (Chronic) ICD-10-CM: F03.90  ICD-9-CM: 294.20  11/8/2016 - Present        SDH (subdural hematoma) ([facility] ICD-10-CM: I62.00  ICD-9-CM: 432.1  11/6/2016 - Present                CDMP Checked:    Yes X         Signed:   John Palmer NP  1/20/2017  3:16 PM

## 2017-01-20 NOTE — ADT AUTH CERT NOTES
Utilization Review           Transient Ischemic Attack (TIA) - Care Day 3 (1/19/2017) by Destiney Connors RN        Review Status Review Entered       Completed 1/20/2017       Details              Care Day: 3 Care Date: 1/19/2017 Level of Care: Telemetry       Guideline Day 2        Clinical Status       (X) * Hemodynamic stability       (X) * Blood pressure acceptable       ( ) * Resolution of presenting signs and symptoms       ( ) * No TIA recurrence or new neurologic deficit       (X) * Etiology requiring inpatient treatment absent       ( ) * Discharge plans and education understood              Activity       ( ) * Ambulatory              Routes       ( ) * Oral hydration, medications, and diet              1/20/2017 11:11 AM EST by Media Nba       Subject: Additional Clinical Information       97.7   58   19   118/76   97%RA. PT today. Disposition: Plan is to discharge to [address] [place] [address] once insurance authorization is obtained. IVF @ 75ml/hr.                                    * Milestone                  Transient Ischemic Attack (TIA) - Care Day 2 (1/18/2017) by Destiney Connors RN        Review Status Review Entered       Completed 1/20/2017       Details              Care Day: 2 Care Date: 1/18/2017 Level of Care: Telemetry       Guideline Day 2        Clinical Status       (X) * Hemodynamic stability       (X) * Blood pressure acceptable       ( ) * Resolution of presenting signs and symptoms       ( ) * No TIA recurrence or new neurologic deficit       (X) * Etiology requiring inpatient treatment absent       ( ) * Discharge plans and education understood              Activity       ( ) * Ambulatory              Routes       ( ) * Oral hydration, medications, and diet              1/20/2017 11:09 AM EST by Media Nba       Subject: Additional Clinical Information       122/79   97.5   68   15   96% ra. Code S activated. Neurology c/s.  MRI showed   Acute multi foci left cerebral infarction involving the frontal,priental and occipital lobes. Chronic SDH  -Continue asa,lipitor  -CUS <50% stenosis bilateral ICA!  -Daughter would not want any aggressive measures such as CEA if it was indicated. Focus on medical mx. So no MRA/CTA brain  -Tele neurologist consulted. Patient was not candidate for tPA. -Complete work up.  -[facility]F @ 75mlhr.                                    * Milestone

## 2017-01-20 NOTE — PROGRESS NOTES
Problem: Mobility Impaired (Adult and Pediatric)  Goal: *Acute Goals and Plan of Care (Insert Text)  Physical Therapy Goals  Initiated 1/19/2017  1. Patient will move from supine to sit and sit to supine , scoot up and down and roll side to side in bed with modified independence within 7 day(s). 2. Patient will transfer from bed to chair and chair to bed with supervision/set-up using the least restrictive device within 7 day(s). 3. Patient will perform sit to stand with supervision/set-up within 7 day(s). 4. Patient will ambulate with supervision/set-up for 150 feet with the least restrictive device within 7 day(s). 6. Patient will improve Szymanski Balance score by 7 points within 7 days. PHYSICAL THERAPY TREATMENT  Patient: Chandler Ortiz (84 y.o. male)  Date: 1/20/2017  Diagnosis: TIA (transient ischemic attack)  TIA (transient ischemic attack)  Acute ischemic stroke [facility])  Acute CVA (cerebrovascular accident) ([facility] <principal problem not specified>       Precautions: DNR, Fall  Chart, physical therapy assessment, plan of care and goals were reviewed. ASSESSMENT:  Pt agreeable to therapy. Pt was able to increase gait tolerance requiring min to CGA to mobilize. Pt had a decrease right LE clearance that was more pronounced with fatigue. PTA pt was supervision  with gait, pt may benefit from SNF to improve mobility and independence. Progression toward goals:  [X]      Improving appropriately and progressing toward goals  [ ]      Improving slowly and progressing toward goals  [ ]      Not making progress toward goals and plan of care will be adjusted       PLAN:  Patient continues to benefit from skilled intervention to address the above impairments. Continue treatment per established plan of care. Discharge Recommendations:  Matthew Ceja  Further Equipment Recommendations for Discharge:  TBD       SUBJECTIVE:   Patient stated Eric Gimenez.       OBJECTIVE DATA SUMMARY:   Critical Behavior:  Neurologic State: Alert, Confused, Eyes open spontaneously  Orientation Level: Oriented to person, Oriented to place, Disoriented to situation, Disoriented to time  Cognition: Decreased attention/concentration, Decreased command following, Impaired decision making, Follows commands, Poor safety awareness  Safety/Judgement: Awareness of environment  Functional Mobility Training:  Bed Mobility:                                Transfers:  Sit to Stand: Stand-by asssistance; Additional time  Stand to Sit: Contact guard assistance                             Balance:  Sitting: Intact  Standing: Impaired  Standing - Static: Fair  Standing - Dynamic : Fair  Ambulation/Gait Training:  Distance (ft): 300 Feet (ft)  Assistive Device: Gait belt;Walker, rolling  Ambulation - Level of Assistance: Contact guard assistance;Minimal assistance        Gait Abnormalities: Decreased step clearance        Base of Support: Narrowed     Speed/Nyasia: Pace decreased (<100 feet/min)  Step Length: Left shortened;Right shortened                               Stairs:              Neuro Re-Education:  Therapeutic Exercises:      Pain:  Pain Scale 1: Numeric (0 - 10)  Pain Intensity 1: 0              Activity Tolerance:   limited   Please refer to the flowsheet for vital signs taken during this treatment.   After treatment:   [X] Patient left in no apparent distress sitting up in chair  [ ] Patient left in no apparent distress in bed  [X] Call bell left within reach  [X] Nursing notified  [X] Caregiver present  [X] Bed alarm activated      COMMUNICATION/COLLABORATION:   The patients plan of care was discussed with: Registered Nurse     Teresa Chavez PTA   Time Calculation: 17 mins

## 2017-01-25 ENCOUNTER — APPOINTMENT (OUTPATIENT)
Dept: CT IMAGING | Age: 82
End: 2017-01-25
Attending: EMERGENCY MEDICINE
Payer: MEDICARE

## 2017-01-25 ENCOUNTER — HOSPITAL ENCOUNTER (EMERGENCY)
Age: 82
Discharge: NURSING, REHAB OR LONG-TERM CARE | End: 2017-01-25
Attending: EMERGENCY MEDICINE
Payer: MEDICARE

## 2017-01-25 VITALS
DIASTOLIC BLOOD PRESSURE: 62 MMHG | HEIGHT: 69 IN | HEART RATE: 60 BPM | WEIGHT: 137.2 LBS | RESPIRATION RATE: 19 BRPM | OXYGEN SATURATION: 94 % | SYSTOLIC BLOOD PRESSURE: 165 MMHG | BODY MASS INDEX: 20.32 KG/M2 | TEMPERATURE: 97.8 F

## 2017-01-25 DIAGNOSIS — G45.9 TRANSIENT CEREBRAL ISCHEMIA, UNSPECIFIED TYPE: Primary | ICD-10-CM

## 2017-01-25 LAB
ALBUMIN SERPL BCP-MCNC: 4 G/DL (ref 3.5–5)
ALBUMIN/GLOB SERPL: 1.3 {RATIO} (ref 1.1–2.2)
ALP SERPL-CCNC: 89 U/L (ref 45–117)
ALT SERPL-CCNC: 48 U/L (ref 12–78)
ANION GAP BLD CALC-SCNC: 7 MMOL/L (ref 5–15)
AST SERPL W P-5'-P-CCNC: 26 U/L (ref 15–37)
ATRIAL RATE: 58 BPM
BASOPHILS # BLD AUTO: 0 K/UL (ref 0–0.1)
BASOPHILS # BLD: 1 % (ref 0–1)
BILIRUB SERPL-MCNC: 0.8 MG/DL (ref 0.2–1)
BUN SERPL-MCNC: 33 MG/DL (ref 6–20)
BUN/CREAT SERPL: 21 (ref 12–20)
CALCIUM SERPL-MCNC: 8.6 MG/DL (ref 8.5–10.1)
CALCULATED P AXIS, ECG09: 63 DEGREES
CALCULATED R AXIS, ECG10: 2 DEGREES
CALCULATED T AXIS, ECG11: 71 DEGREES
CHLORIDE SERPL-SCNC: 108 MMOL/L (ref 97–108)
CO2 SERPL-SCNC: 24 MMOL/L (ref 21–32)
CREAT SERPL-MCNC: 1.54 MG/DL (ref 0.7–1.3)
DIAGNOSIS, 93000: NORMAL
EOSINOPHIL # BLD: 0.4 K/UL (ref 0–0.4)
EOSINOPHIL NFR BLD: 6 % (ref 0–7)
ERYTHROCYTE [DISTWIDTH] IN BLOOD BY AUTOMATED COUNT: 14.3 % (ref 11.5–14.5)
GLOBULIN SER CALC-MCNC: 3.1 G/DL (ref 2–4)
GLUCOSE BLD STRIP.AUTO-MCNC: 97 MG/DL (ref 65–100)
GLUCOSE SERPL-MCNC: 93 MG/DL (ref 65–100)
HCT VFR BLD AUTO: 34.9 % (ref 36.6–50.3)
HGB BLD-MCNC: 11.4 G/DL (ref 12.1–17)
INR BLD: 1 (ref 0.9–1.2)
LYMPHOCYTES # BLD AUTO: 19 % (ref 12–49)
LYMPHOCYTES # BLD: 1.3 K/UL (ref 0.8–3.5)
MCH RBC QN AUTO: 29.5 PG (ref 26–34)
MCHC RBC AUTO-ENTMCNC: 32.7 G/DL (ref 30–36.5)
MCV RBC AUTO: 90.4 FL (ref 80–99)
MONOCYTES # BLD: 0.6 K/UL (ref 0–1)
MONOCYTES NFR BLD AUTO: 9 % (ref 5–13)
NEUTS SEG # BLD: 4.3 K/UL (ref 1.8–8)
NEUTS SEG NFR BLD AUTO: 65 % (ref 32–75)
P-R INTERVAL, ECG05: 218 MS
PLATELET # BLD AUTO: 153 K/UL (ref 150–400)
POTASSIUM SERPL-SCNC: 4.4 MMOL/L (ref 3.5–5.1)
PROT SERPL-MCNC: 7.1 G/DL (ref 6.4–8.2)
Q-T INTERVAL, ECG07: 436 MS
QRS DURATION, ECG06: 88 MS
QTC CALCULATION (BEZET), ECG08: 428 MS
RBC # BLD AUTO: 3.86 M/UL (ref 4.1–5.7)
SERVICE CMNT-IMP: NORMAL
SODIUM SERPL-SCNC: 139 MMOL/L (ref 136–145)
TROPONIN I SERPL-MCNC: <0.04 NG/ML
VENTRICULAR RATE, ECG03: 58 BPM
WBC # BLD AUTO: 6.5 K/UL (ref 4.1–11.1)

## 2017-01-25 PROCEDURE — 80053 COMPREHEN METABOLIC PANEL: CPT | Performed by: EMERGENCY MEDICINE

## 2017-01-25 PROCEDURE — 70450 CT HEAD/BRAIN W/O DYE: CPT

## 2017-01-25 PROCEDURE — 85025 COMPLETE CBC W/AUTO DIFF WBC: CPT | Performed by: EMERGENCY MEDICINE

## 2017-01-25 PROCEDURE — 85610 PROTHROMBIN TIME: CPT

## 2017-01-25 PROCEDURE — 36415 COLL VENOUS BLD VENIPUNCTURE: CPT | Performed by: EMERGENCY MEDICINE

## 2017-01-25 PROCEDURE — 82962 GLUCOSE BLOOD TEST: CPT

## 2017-01-25 PROCEDURE — 99285 EMERGENCY DEPT VISIT HI MDM: CPT

## 2017-01-25 PROCEDURE — 93005 ELECTROCARDIOGRAM TRACING: CPT

## 2017-01-25 PROCEDURE — 84484 ASSAY OF TROPONIN QUANT: CPT | Performed by: EMERGENCY MEDICINE

## 2017-01-25 NOTE — PROGRESS NOTES
Spiritual Care Assessment/Progress Notes    Jessica Fuller [id number]  [id number]    6/2/1925  80 y.o.  male    Patient Telephone Number: [phone] (home)   Scientologist Affiliation: Orthodox   Language: English   Extended Emergency Contact Information  Primary Emergency Contact: [address] Phone: [phone]  Relation: Daughter   Patient Active Problem List    Diagnosis Date Noted    Acute ischemic stroke ([facility] 01/18/2017    [facility]) [facility] 01/18/2017    Stroke (cerebrum) [facility] 01/17/2017    HTN (hypertension) 11/08/2016    BPH (benign prostatic hyperplasia) 11/08/2016    Dementia 11/08/2016    SDH (subdural hematoma) ([facility] 11/06/2016        Date: 1/25/2017       Level of Scientologist/Spiritual Activity:  []         Involved in molly tradition/spiritual practice    []         Not involved in molly tradition/spiritual practice  []         Spiritually oriented    []         Claims no spiritual orientation    []         seeking spiritual identity  []         Feels alienated from Scientologist practice/tradition  []         Feels angry about Scientologist practice/tradition  [x]         Spirituality/Scientologist tradition may be a resource for coping at this time.   []         Not able to assess due to medical condition    Services Provided Today:  []         crisis intervention    []         reading Scriptures  []         spiritual assessment    []         prayer  [x]         empathic listening/emotional support  []         rites and rituals (cite in comments)  []         life review     []         Scientologist support  []         theological development   []         advocacy  []         ethical dialog     []         blessing  []         bereavement support    [x]         support to family  []         anticipatory grief support   []         help with AMD  []         spiritual guidance    []         meditation      Spiritual Care Needs  []         Emotional Support  []         Spiritual/Mormon Care  []         Loss/Adjustment  []         Advocacy/Referral                /Ethics  [x]         No needs expressed at               this time  []         Other: (note in               comments)  Spiritual Care Plan  []         Follow up visits with               pt/family  []         Provide materials  []         Schedule sacraments  []         Contact Community               Clergy  [x]         Follow up as needed  []         Other: (note in               comments)     Initial spiritual assessment in ER11 in response to code stroke. Found daughter in room as patient was just being wheeled in from [address] [facility]. Aware that clinical staff would be in need of her attention, made an introduction and was informed that this was a second stroke---had a prior stroke very recently. Daughter was composed and is expecting the arrival of a friend for support soon. Provided her with contact information and reminder of  availability as needed. [facility]'s Staff  (Reginaldo Cooper Patient Care Specialist)   Paging Service [phone])

## 2017-01-25 NOTE — DISCHARGE INSTRUCTIONS
Transient Ischemic Attack: Care Instructions  Your Care Instructions    A transient ischemic attack (TIA) is when blood flow to a part of your brain is blocked for a short time. A TIA is like a stroke but usually lasts only a few minutes. A TIA does not cause lasting brain damage. Any vision problems, slurred speech, or other symptoms usually go away in 10 to 20 minutes. But they may last for up to 24 hours. TIAs are often warning signs of a stroke. Some people who have a TIA may have a stroke in the future. A stroke can cause symptoms like those of a TIA. But a stroke causes lasting damage to your brain. You can take steps to help prevent a stroke. One thing you can do is get early treatment. If you have other new symptoms, or if your symptoms do not get better, go back to the emergency room or call your doctor right away. Getting treatment right away may prevent long-term brain damage caused by a stroke. The doctor has checked you carefully, but problems can develop later. If you notice any problems or new symptoms, get medical treatment right away. Follow-up care is a key part of your treatment and safety. Be sure to make and go to all appointments, and call your doctor if you are having problems. It's also a good idea to know your test results and keep a list of the medicines you take. How can you care for yourself at home? Medicines  · Be safe with medicines. Take your medicines exactly as prescribed. Call your doctor if you think you are having a problem with your medicine. · If you take a blood thinner, such as aspirin, be sure you get instructions about how to take your medicine safely. Blood thinners can cause serious bleeding problems. · Call your doctor if you are not able to take your medicines for any reason. · Do not take any over-the-counter medicines or herbal products without talking to your doctor first.  · If you take birth control pills or hormone therapy, talk to your doctor.  Ask if these treatments are right for you. Lifestyle changes  · Do not smoke. If you need help quitting, talk to your doctor about stop-smoking programs and medicines. · Be active. If your doctor recommends it, get more exercise. Walking is a good choice. Bit by bit, increase the amount you walk every day. Try for at least 30 minutes on most days of the week. You also may want to swim, bike, or do other activities. · Eat heart-healthy foods. These include fruits, vegetables, high-fiber foods, fish, and foods that are low in sodium, saturated fat, and trans fat. · Stay at a healthy weight. Lose weight if you need to. · Limit alcohol to 2 drinks a day for men and 1 drink a day for women. Staying healthy  · Manage other health problems such as diabetes, high blood pressure, and high cholesterol. · Get the flu vaccine every year. When should you call for help? Call 911 anytime you think you may need emergency care. For example, call if:  · You have new or worse symptoms of a stroke. These may include:  ¨ Sudden numbness, tingling, weakness, or loss of movement in your face, arm, or leg, especially on only one side of your body. ¨ Sudden vision changes. ¨ Sudden trouble speaking. ¨ Sudden confusion or trouble understanding simple statements. ¨ Sudden problems with walking or balance. ¨ A sudden, severe headache that is different from past headaches. Call 911 even if these symptoms go away in a few minutes. · You feel like you are having another TIA. Watch closely for changes in your health, and be sure to contact your doctor if you have any problems. Where can you learn more? Go to http://pranay-sundeep.info/. Enter [phone] in the search box to learn more about \"Transient Ischemic Attack: Care Instructions. \"  Current as of: June 4, 2016  Content Version: 11.1  © 9616-0601 [email or web address].  Care instructions adapted under license by [employer] (which disclaims liability or warranty for this information). If you have questions about a medical condition or this instruction, always ask your healthcare professional. [place] [address] any warranty or liability for your use of this information. We hope that we have addressed all of your medical concerns. The examination and treatment you received in the Emergency Department were for an emergent problem and were not intended as complete care. It is important that you follow up with your healthcare provider(s) for ongoing care. If your symptoms worsen or do not improve as expected, and you are unable to reach your usual health care provider(s), you should return to the Emergency Department. Today's healthcare is undergoing tremendous change, and patient satisfaction surveys are one of the many tools to assess the quality of medical care. You may receive a survey from the [employer] regarding your experience in the Emergency Department. I hope that your experience has been completely positive, particularly the medical care that I provided. As such, please participate in the survey; anything less than excellent does not meet my expectations or intentions. [address] [place] [address] and [employer] participate in nationally recognized quality of care measures. If your blood pressure is greater than 120/80, as reported below, we urge that you seek medical care to address the potential of high blood pressure, commonly known as hypertension. Hypertension can be hereditary or can be caused by certain medical conditions, pain, stress, or \"white coat syndrome. \"       Please make an appointment with your health care provider(s) for follow up of your Emergency Department visit.        VITALS:   Patient Vitals for the past 8 hrs:   Temp Pulse Resp BP SpO2   01/25/17 0900 - (!) 58 14 166/63 96 %   01/25/17 0852 - - - - 97 %   01/25/17 0851 97.4 °F (36.3 °C) (!) 57 14 163/67 97 %          Thank you for allowing us to provide you with medical care today. We realize that you have many choices for your emergency care needs. Please choose us in the future for any continued health care needs. Monae Alvarez MD    [facility], [facility].   Office: [phone]            Recent Results (from the past 24 hour(s))   EKG, 12 LEAD, INITIAL    Collection Time: 01/25/17  8:44 AM   Result Value Ref Range    Ventricular Rate 58 BPM    Atrial Rate 58 BPM    P-R Interval 218 ms    QRS Duration 88 ms    Q-T Interval 436 ms    QTC Calculation (Bezet) 428 ms    Calculated P Axis 63 degrees    Calculated R Axis 2 degrees    Calculated T Axis 71 degrees    Diagnosis       Sinus bradycardia with 1st degree AV block  When compared with ECG of 17-JAN-2017 17:50,  No significant change was found     CBC WITH AUTOMATED DIFF    Collection Time: 01/25/17  8:47 AM   Result Value Ref Range    WBC 6.5 4.1 - 11.1 K/uL    RBC 3.86 (L) 4.10 - 5.70 M/uL    HGB 11.4 (L) 12.1 - 17.0 g/dL    HCT 34.9 (L) 36.6 - 50.3 %    MCV 90.4 80.0 - 99.0 FL    MCH 29.5 26.0 - 34.0 PG    MCHC 32.7 30.0 - 36.5 g/dL    RDW 14.3 11.5 - 14.5 %    PLATELET 301 237 - 613 K/uL    NEUTROPHILS 65 32 - 75 %    LYMPHOCYTES 19 12 - 49 %    MONOCYTES 9 5 - 13 %    EOSINOPHILS 6 0 - 7 %    BASOPHILS 1 0 - 1 %    ABS. NEUTROPHILS 4.3 1.8 - 8.0 K/UL    ABS. LYMPHOCYTES 1.3 0.8 - 3.5 K/UL    ABS. MONOCYTES 0.6 0.0 - 1.0 K/UL    ABS. EOSINOPHILS 0.4 0.0 - 0.4 K/UL    ABS.  BASOPHILS 0.0 0.0 - 0.1 K/UL   METABOLIC PANEL, COMPREHENSIVE    Collection Time: 01/25/17  8:47 AM   Result Value Ref Range    Sodium 139 136 - 145 mmol/L    Potassium 4.4 3.5 - 5.1 mmol/L    Chloride 108 97 - 108 mmol/L    CO2 24 21 - 32 mmol/L    Anion gap 7 5 - 15 mmol/L    Glucose 93 65 - 100 mg/dL    BUN 33 (H) 6 - 20 MG/DL    Creatinine 1.54 (H) 0.70 - 1.30 MG/DL    BUN/Creatinine ratio 21 (H) 12 - 20      GFR est AA 52 (L) >60 ml/min/1.73m2 GFR est non-AA 43 (L) >60 ml/min/1.73m2    Calcium 8.6 8.5 - 10.1 MG/DL    Bilirubin, total 0.8 0.2 - 1.0 MG/DL    ALT 48 12 - 78 U/L    AST 26 15 - 37 U/L    Alk. phosphatase 89 45 - 117 U/L    Protein, total 7.1 6.4 - 8.2 g/dL    Albumin 4.0 3.5 - 5.0 g/dL    Globulin 3.1 2.0 - 4.0 g/dL    A-G Ratio 1.3 1.1 - 2.2     TROPONIN I    Collection Time: 01/25/17  8:47 AM   Result Value Ref Range    Troponin-I, Qt. <0.04 <0.05 ng/mL   GLUCOSE, POC    Collection Time: 01/25/17  8:47 AM   Result Value Ref Range    Glucose (POC) 97 65 - 100 mg/dL    Performed by Mckay Moreno    POC INR    Collection Time: 01/25/17  8:49 AM   Result Value Ref Range    INR (POC) 1.0 <1.2         Ct Code Neuro Head Wo Contrast    Result Date: 1/25/2017  EXAM:  CT CODE NEURO HEAD WO CONTRAST INDICATION:   left sided weakness this morning COMPARISON: 1/17/2017. TECHNIQUE: Unenhanced CT of the head was performed using 5 mm images. Brain and bone windows were generated. CT dose reduction was achieved through use of a standardized protocol tailored for this examination and automatic exposure control for dose modulation. Adaptive statistical iterative reconstruction (ASIR) was utilized. FINDINGS: The ventricles and sulci are normal in size, shape and configuration and midline. There is no significant white matter disease. There is no intracranial hemorrhage, extra-axial collection, mass, mass effect or midline shift. The basilar cisterns are open. No acute infarct is identified. The bone windows demonstrate no abnormalities. The visualized portions of the paranasal sinuses and mastoid air cells are clear. Vascular calcification is noted. IMPRESSION: No acute abnormality.

## 2017-01-25 NOTE — ED NOTES
Pt assisted to use bedside commode. Pt had bowel movement. Pericare provided. 2 RN assisted back to bed. Brief replaced.

## 2017-01-25 NOTE — ED TRIAGE NOTES
Triage: Pt arrives by EMS from home. Last known well at 4 AM. Got up at 6:45 and had complete left side weakness. EMS was called at 8am. BG 93.

## 2017-01-25 NOTE — ED PROVIDER NOTES
HPI Comments: 80 y.o. male with past medical history significant for hypertension and memory impairment who presents via EMS with chief complaint of extremity weakness. Per EMS, patient has a history of multiple CVAs, both ischemic and hemorrhagic, with residual right sided weakness. Patient was last seen normal by staff at [facility] at 0400 (4.5 hours ago). EMS states they were told that at 0645 (1.75 hours ago) patient got up to go the the bathroom and fell. He was found with complete left sided weakness but was still speaking normally. EMS was called at 0800 (30 minutes ago). /76, BG 93. There are no other acute medical concerns at this time. PCP: Shahrzad Nguyen MD    Review of records:  Patient was seen at [facility] and D/C on 1/20/17 after having a large left sided CVA. Full history, physical exam, and ROS unable to be obtained due to:  dementia. Note written by debo Felix, as dictated by Jovita Brown MD 8:36 AM      The history is provided by the patient. Past Medical History:   Diagnosis Date    Hypertension     Memory impairment        No past surgical history on file. No family history on file. Social History     Social History    Marital status:      Spouse name: N/A    Number of children: N/A    Years of education: N/A     Occupational History    Not on file. Social History Main Topics    Smoking status: Current Some Day Smoker    Smokeless tobacco: Not on file      Comment: [address]    Alcohol use Yes    Drug use: No    Sexual activity: Not on file     Other Topics Concern    Not on file     Social History Narrative         ALLERGIES: Review of patient's allergies indicates no known allergies. Review of Systems   Unable to perform ROS: Dementia       There were no vitals filed for this visit. Physical Exam   Constitutional: He is oriented to person, place, and time. He appears well-developed and well-nourished. No distress. HENT:   Head: Normocephalic and atraumatic. Mouth/Throat: Oropharynx is clear and moist.   Eyes: Pupils are equal, round, and reactive to light. Neck: Normal range of motion. Neck supple. Cardiovascular: Normal rate, regular rhythm, normal heart sounds and intact distal pulses. Pulmonary/Chest: Effort normal and breath sounds normal. No respiratory distress. Abdominal: Soft. Bowel sounds are normal. He exhibits no distension. There is no tenderness. Musculoskeletal: Normal range of motion. He exhibits no edema. Neurological: He is alert and oriented to person, place, and time. 4/5 strength in the left upper and left lower extremities. Skin: Skin is warm and dry. Nursing note and vitals reviewed. Note written by debo Johnson, as dictated by Rodrigo Martinez MD 8:36 AM       MDM  Number of Diagnoses or Management Options  Transient cerebral ischemia, unspecified type:   Diagnosis management comments: 79yo M with pmh sig for recent CVA discharged from [place] [address] on 1/20 to nursing facility. Pt woke up this AM at 6:45 AM and fell getting out of bed. Pt reports L sided weakness. Symptoms have mostly resolved by arrival in ED. Pt moving all four extremities easily on CT table.  + dementia. Due to recent CVA, pt is not a tpa candidate. P:  Head ct  ecg  labs    ED Course       Procedures  CONSULT NOTE:  8:38 AM Rodrigo Martniez MD spoke with Dr. Myah Aparicio, Consult for Teleneurology. Discussed available diagnostic tests and clinical findings. She is in agreement that the patient is not a TPA candidate due to recent CVA. Head CT and labs unremarkable. Pt's symptoms have completely resolved. NO focal deficits at this time. Pt with no complaints. I discussed the results with patient and his daughter. Given recent hospitalization, thorough work up, and daughter's wishes to not pursue any invasive testing/treatment, I don't see the benefit of admission at this time.   Pt is already on asa, static, bp meds for optimal medical management. He is not a good candidate for add'l anticoaguation given fall risk. With symptoms now resolved, pt stable for discharge back to nursing facility. Pt to f/u with PCP and neurologist.      10:54 AM  Pt has been reevaluated. There are no new complaints, changes, or physical findings at this time. Medications have been reviewed w/ pt and/or family. Pt and/or family's questions have been answered. Pt and/or family expressed good understanding of the dx/tx/rx and is in agreement with plan of care. Pt instructed and agreed to f/u w/ PCP and neurologist and to return to ED upon further deterioration. Pt is ready for discharge. LABORATORY TESTS:  Recent Results (from the past 12 hour(s))   EKG, 12 LEAD, INITIAL    Collection Time: 01/25/17  8:44 AM   Result Value Ref Range    Ventricular Rate 58 BPM    Atrial Rate 58 BPM    P-R Interval 218 ms    QRS Duration 88 ms    Q-T Interval 436 ms    QTC Calculation (Bezet) 428 ms    Calculated P Axis 63 degrees    Calculated R Axis 2 degrees    Calculated T Axis 71 degrees    Diagnosis       Sinus bradycardia with 1st degree AV block  When compared with ECG of 17-JAN-2017 17:50,  No significant change was found     CBC WITH AUTOMATED DIFF    Collection Time: 01/25/17  8:47 AM   Result Value Ref Range    WBC 6.5 4.1 - 11.1 K/uL    RBC 3.86 (L) 4.10 - 5.70 M/uL    HGB 11.4 (L) 12.1 - 17.0 g/dL    HCT 34.9 (L) 36.6 - 50.3 %    MCV 90.4 80.0 - 99.0 FL    MCH 29.5 26.0 - 34.0 PG    MCHC 32.7 30.0 - 36.5 g/dL    RDW 14.3 11.5 - 14.5 %    PLATELET 892 582 - 936 K/uL    NEUTROPHILS 65 32 - 75 %    LYMPHOCYTES 19 12 - 49 %    MONOCYTES 9 5 - 13 %    EOSINOPHILS 6 0 - 7 %    BASOPHILS 1 0 - 1 %    ABS. NEUTROPHILS 4.3 1.8 - 8.0 K/UL    ABS. LYMPHOCYTES 1.3 0.8 - 3.5 K/UL    ABS. MONOCYTES 0.6 0.0 - 1.0 K/UL    ABS. EOSINOPHILS 0.4 0.0 - 0.4 K/UL    ABS.  BASOPHILS 0.0 0.0 - 0.1 K/UL   METABOLIC PANEL, COMPREHENSIVE    Collection Time: 01/25/17  8:47 AM   Result Value Ref Range    Sodium 139 136 - 145 mmol/L    Potassium 4.4 3.5 - 5.1 mmol/L    Chloride 108 97 - 108 mmol/L    CO2 24 21 - 32 mmol/L    Anion gap 7 5 - 15 mmol/L    Glucose 93 65 - 100 mg/dL    BUN 33 (H) 6 - 20 MG/DL    Creatinine 1.54 (H) 0.70 - 1.30 MG/DL    BUN/Creatinine ratio 21 (H) 12 - 20      GFR est AA 52 (L) >60 ml/min/1.73m2    GFR est non-AA 43 (L) >60 ml/min/1.73m2    Calcium 8.6 8.5 - 10.1 MG/DL    Bilirubin, total 0.8 0.2 - 1.0 MG/DL    ALT 48 12 - 78 U/L    AST 26 15 - 37 U/L    Alk. phosphatase 89 45 - 117 U/L    Protein, total 7.1 6.4 - 8.2 g/dL    Albumin 4.0 3.5 - 5.0 g/dL    Globulin 3.1 2.0 - 4.0 g/dL    A-G Ratio 1.3 1.1 - 2.2     TROPONIN I    Collection Time: 01/25/17  8:47 AM   Result Value Ref Range    Troponin-I, Qt. <0.04 <0.05 ng/mL   GLUCOSE, POC    Collection Time: 01/25/17  8:47 AM   Result Value Ref Range    Glucose (POC) 97 65 - 100 mg/dL    Performed by Ricki Evans    POC INR    Collection Time: 01/25/17  8:49 AM   Result Value Ref Range    INR (POC) 1.0 <1.2         IMAGING RESULTS:  CT CODE NEURO HEAD WO CONTRAST   Final Result          MEDICATIONS GIVEN:  Medications - No data to display    IMPRESSION:  1. Transient cerebral ischemia, unspecified type        PLAN:  1. Current Discharge Medication List      CONTINUE these medications which have NOT CHANGED    Details   aspirin (ASPIRIN) 325 mg tablet Take 1 Tab by mouth daily. Qty: 30 Tab, Refills: 0      atorvastatin (LIPITOR) 20 mg tablet Take 1 Tab by mouth daily. Qty: 30 Tab, Refills: 0      !! acetaminophen (TYLENOL EXTRA STRENGTH) 500 mg tablet Take 1,000 mg by mouth two (2) times a day. !! acetaminophen (TYLENOL EXTRA STRENGTH) 500 mg tablet Take 500 mg by mouth every four (4) hours as needed for Pain. amLODIPine (NORVASC) 10 mg tablet Take 10 mg by mouth daily. Indications: Hypertension      donepezil (ARICEPT) 10 mg tablet Take 10 mg by mouth nightly. cholecalciferol, vitamin D3, (VITAMIN D3) 2,000 unit tab Take 1 Tab by mouth daily. escitalopram oxalate (LEXAPRO) 10 mg tablet Take 10 mg by mouth daily. melatonin 3 mg tablet Take 3 mg by mouth nightly. memantine ER (NAMENDA XR) 28 mg capsule Take 28 mg by mouth daily. QUEtiapine (SEROQUEL) 25 mg tablet Take 12.5 mg by mouth two (2) times daily as needed. tamsulosin (FLOMAX) 0.4 mg capsule Take 0.4 mg by mouth daily. Indications: SYMPTOMATIC BENIGN PROSTATIC HYPERPLASIA       ! ! - Potential duplicate medications found. Please discuss with provider. 2.   Follow-up Information     Follow up With Details Comments Contact Info    Your regular doctor.        Alyssa Dickson, DO Schedule an appointment as soon as possible for a visit  [facility] at [address] [place] [address] [id number]      [facility] [address], [facility] DEP  If symptoms worsen [address] [facility]  [phone]          Return to ED if worse

## 2017-01-25 NOTE — ED NOTES
Pt transported by [facility] out of ED with discharge instructions and prescriptions in hand given by Dr. Sam Mendosa; pt verbalized understanding of discharge paperwork and time allotted for questions. VSS. Pt alert and oriented.

## 2017-01-25 NOTE — ED NOTES
Called [facility] after they have gone over their time frame.   They stated another 20 minutes before squad would be here

## 2017-01-26 NOTE — CALL BACK NOTE
[facility] Emergency Department Follow Up Call Record    Discharged to : Home/Family Home/Home Health/Skilled Facility/Rehab/Assisted Living/Other___Regina CHACON____  1) Did you receive your discharge instructions? YES    Spoke with [facility], (nurse) who reports Mr. Edu García is lethargic today. The facility did receive discharge instructions and report concerning this patient. 2) Do you understand them? YES         3) Are you able to follow them? YES          If NO, what can I clarify for you? 4) Do you understand your diagnosis? YES         5) Do you know which symptoms should prompt you to call the doctor? YES     6) Were you able to fill and  any medications that were prescribed? YES     7) You were prescribed __n/a_________for ____________________. Common side effects of this medication are____________________. This is not a complete list so please review the forms given from the pharmacy for a complete list.      8) Are there any questions about your medications? {YES/NO:[id number]   NO            Have you scheduled any recommended doctors appointments (specialty, PCP) YES  If NO, what barriers are you encountering (transportation/lost contact info/cost/  didnt think necessary/no PCP  9) If discharged with Home Health, has the agency contacted you to schedule visit? NOT APPLICABLE  10) Is there anyone available to help you at home (meals, errands, transportation    monitoring) (adult children, neighbors, private duty companions) YES    11) Are you on a special diet? NO         If YES, do you understand the requirements for this diet? Education provided? 12) If presented with cough, bronchitis, COPD, asthma, is it ok to ask that the   respiratory disease management educator call you? NOT APPLICABLE      13)  A) If presented with fall, were you issued an assistive device in the ED    Are you using? NOT APPLICABLE  B) If given RX for device, have you obtained? NOT APPLICABLE       If NO, barriers? C) Therapist recommended: NO   Are you able to implement the suggestions? NOT APPLICABLE        If NO, barriers to implementation? D) Are you having any difficulties with mobility inside your home?     (steps, bed, tub) YES. Right sided weakness due to hx of CVA. If YES, ask if the [facility] PT can contact patient and good time and number?  14)  At the end of your discharge instructions, there is information about accessing \[facility] [place] [facility]\"" & [facility] SERVICES, have you had a chance to review those? NO         Do you have any questions about signing up for this service? We encourage our patients to be active participants in their healthcare and this site is one of the ways to do that. It will allow you to access parts of your medical record, email your doctors office, schedule appointments, and request medications refills . 15) Are there any other questions that I can answer for you regarding    your Emergency department visit?  NO             Estimated Call Time:____11:46 AM  _______________ Date/Time:_______________

## 2021-08-03 PROBLEM — I63.9 STROKE (CEREBRUM) (HCC): Status: RESOLVED | Noted: 2017-01-17 | Resolved: 2021-08-03
